# Patient Record
Sex: MALE | Race: WHITE | Employment: UNEMPLOYED | URBAN - METROPOLITAN AREA
[De-identification: names, ages, dates, MRNs, and addresses within clinical notes are randomized per-mention and may not be internally consistent; named-entity substitution may affect disease eponyms.]

---

## 2018-06-16 ENCOUNTER — APPOINTMENT (OUTPATIENT)
Dept: GENERAL RADIOLOGY | Age: 28
End: 2018-06-16
Attending: PHYSICIAN ASSISTANT
Payer: COMMERCIAL

## 2018-06-16 ENCOUNTER — HOSPITAL ENCOUNTER (EMERGENCY)
Age: 28
Discharge: HOME | End: 2018-06-16
Attending: EMERGENCY MEDICINE | Admitting: EMERGENCY MEDICINE
Payer: COMMERCIAL

## 2018-06-16 VITALS
SYSTOLIC BLOOD PRESSURE: 148 MMHG | DIASTOLIC BLOOD PRESSURE: 92 MMHG | OXYGEN SATURATION: 98 % | TEMPERATURE: 99 F | WEIGHT: 203.48 LBS | RESPIRATION RATE: 12 BRPM | BODY MASS INDEX: 29.2 KG/M2 | HEART RATE: 95 BPM

## 2018-06-16 DIAGNOSIS — S61.552A DOG BITE OF WRIST, LEFT, INITIAL ENCOUNTER: Primary | ICD-10-CM
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
## 2018-06-16 DIAGNOSIS — S61.451A DOG BITE OF RIGHT HAND, INITIAL ENCOUNTER: ICD-10-CM
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
## 2018-06-16 DIAGNOSIS — F17.200 NICOTINE DEPENDENCE, UNCOMPLICATED, UNSPECIFIED NICOTINE PRODUCT TYPE: ICD-10-CM

## 2018-06-16 PROCEDURE — 74011000250 HC RX REV CODE- 250: Performed by: PHYSICIAN ASSISTANT

## 2018-06-16 PROCEDURE — 74011250637 HC RX REV CODE- 250/637: Performed by: PHYSICIAN ASSISTANT

## 2018-06-16 PROCEDURE — 99283 EMERGENCY DEPT VISIT LOW MDM: CPT

## 2018-06-16 PROCEDURE — 73110 X-RAY EXAM OF WRIST: CPT

## 2018-06-16 RX ORDER — AMOXICILLIN AND CLAVULANATE POTASSIUM 875; 125 MG/1; MG/1
1 TABLET, FILM COATED ORAL
Status: COMPLETED | OUTPATIENT
Start: 2018-06-16 | End: 2018-06-16

## 2018-06-16 RX ORDER — AMOXICILLIN AND CLAVULANATE POTASSIUM 875; 125 MG/1; MG/1
1 TABLET, FILM COATED ORAL 2 TIMES DAILY
Qty: 13 TAB | Refills: 0 | Status: SHIPPED | OUTPATIENT
Start: 2018-06-16 | End: 2021-04-09 | Stop reason: ALTCHOICE

## 2018-06-16 RX ADMIN — AMOXICILLIN AND CLAVULANATE POTASSIUM 1 TABLET: 875; 125 TABLET, FILM COATED ORAL at 14:51

## 2018-06-16 RX ADMIN — Medication 5 ML: at 14:51

## 2018-06-16 NOTE — DISCHARGE INSTRUCTIONS
Animal Bites: Care Instructions  Your Care Instructions  After an animal bite, the biggest concern is infection. The chance of infection depends on the type of animal that bit you, where on your body you were bitten, and your general health. Many animal bites are not closed with stitches, because this can increase the chance of infection. Your bite may take as little as 7 days or as long as several months to heal, depending on how bad it is. Taking good care of your wound at home will help it heal and reduce your chance of infection. The doctor has checked you carefully, but problems can develop later. If you notice any problems or new symptoms, get medical treatment right away. Follow-up care is a key part of your treatment and safety. Be sure to make and go to all appointments, and call your doctor if you are having problems. It's also a good idea to know your test results and keep a list of the medicines you take. How can you care for yourself at home? · If your doctor told you how to care for your wound, follow your doctor's instructions. If you did not get instructions, follow this general advice:  ¨ After 24 to 48 hours, gently wash the wound with clean water 2 times a day. Do not scrub or soak the wound. Don't use hydrogen peroxide or alcohol, which can slow healing. ¨ You may cover the wound with a thin layer of petroleum jelly, such as Vaseline, and a nonstick bandage. ¨ Apply more petroleum jelly and replace the bandage as needed. · After you shower, gently dry the wound with a clean towel. · If your doctor has closed the wound, cover the bandage with a plastic bag before you take a shower. · A small amount of skin redness and swelling around the wound edges and the stitches or staples is normal. Your wound may itch or feel irritated. Do not scratch or rub the wound.   · Ask your doctor if you can take an over-the-counter pain medicine, such as acetaminophen (Tylenol), ibuprofen (Advil, Motrin), or naproxen (Aleve). Read and follow all instructions on the label. · Do not take two or more pain medicines at the same time unless the doctor told you to. Many pain medicines have acetaminophen, which is Tylenol. Too much acetaminophen (Tylenol) can be harmful. · If your bite puts you at risk for rabies, you will get a series of shots over the next few weeks to prevent rabies. Your doctor will tell you when to get the shots. It is very important that you get the full cycle of shots. Follow your doctor's instructions exactly. · You may need a tetanus shot if you have not received one in the last 5 years. · If your doctor prescribed antibiotics, take them as directed. Do not stop taking them just because you feel better. You need to take the full course of antibiotics. When should you call for help? Call your doctor now or seek immediate medical care if:  ? · The skin near the bite turns cold or pale or it changes color. ? · You lose feeling in the area near the bite, or it feels numb or tingly. ? · You have trouble moving a limb near the bite. ? · You have symptoms of infection, such as:  ¨ Increased pain, swelling, warmth, or redness near the wound. ¨ Red streaks leading from the wound. ¨ Pus draining from the wound. ¨ A fever. ? · Blood soaks through the bandage. Oozing small amounts of blood is normal.   ? · Your pain is getting worse. ? Watch closely for changes in your health, and be sure to contact your doctor if you are not getting better as expected. Where can you learn more? Go to http://yoni-eleni.info/. Enter B363 in the search box to learn more about \"Animal Bites: Care Instructions. \"  Current as of: March 20, 2017  Content Version: 11.4  © 2950-1311 [employer]. Care instructions adapted under license by [employer] (which disclaims liability or warranty for this information).  If you have questions about a medical condition or this instruction, always ask your healthcare professional. [place] [address] any warranty or liability for your use of this information.

## 2018-06-16 NOTE — LETTER
[address] 
[facility] EMERGENCY DEPT 
[address] [place] [address] [address] [id number] [phone] Work/School Note Date: 6/16/2018 To Whom It May concern: 
 
Himanshu Miranda was seen and treated today in the emergency room by the following provider(s): 
Physician Assistant: TERESA Barker. Himanshu Miranda may return to work on 6/19/18. Sincerely, Art Barker

## 2018-06-19 ENCOUNTER — HOSPITAL ENCOUNTER (OUTPATIENT)
Dept: MRI IMAGING | Age: 28
Discharge: HOME | End: 2018-06-19
Attending: ORTHOPAEDIC SURGERY
Payer: COMMERCIAL

## 2018-06-19 VITALS — BODY MASS INDEX: 28.7 KG/M2 | WEIGHT: 200 LBS
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
## 2018-06-19 DIAGNOSIS — S61.552A: ICD-10-CM

## 2018-06-19 PROCEDURE — 74011250636 HC RX REV CODE- 250/636: Performed by: ORTHOPAEDIC SURGERY

## 2018-06-19 PROCEDURE — A9575 INJ GADOTERATE MEGLUMI 0.1ML: HCPCS | Performed by: ORTHOPAEDIC SURGERY

## 2018-06-19 PROCEDURE — 73223 MRI JOINT UPR EXTR W/O&W/DYE: CPT

## 2018-06-19 RX ORDER — GADOTERATE MEGLUMINE 376.9 MG/ML
19 INJECTION INTRAVENOUS
Status: COMPLETED | OUTPATIENT
Start: 2018-06-19 | End: 2018-06-19

## 2018-06-19 RX ADMIN — GADOTERATE MEGLUMINE 19 ML: 376.9 INJECTION INTRAVENOUS at 17:21

## 2021-04-09 ENCOUNTER — OFFICE VISIT (OUTPATIENT)
Dept: FAMILY MEDICINE CLINIC | Age: 31
End: 2021-04-09

## 2021-04-09 VITALS
HEIGHT: 71 IN | DIASTOLIC BLOOD PRESSURE: 62 MMHG | OXYGEN SATURATION: 97 % | TEMPERATURE: 96.8 F | HEART RATE: 78 BPM | RESPIRATION RATE: 18 BRPM | SYSTOLIC BLOOD PRESSURE: 108 MMHG | WEIGHT: 193.2 LBS | BODY MASS INDEX: 27.05 KG/M2

## 2021-04-09 DIAGNOSIS — R11.0 CHRONIC NAUSEA: ICD-10-CM

## 2021-04-09 DIAGNOSIS — F11.20 METHADONE DEPENDENCE (HCC): ICD-10-CM

## 2021-04-09 DIAGNOSIS — F31.9 BIPOLAR AFFECTIVE DISORDER, REMISSION STATUS UNSPECIFIED (HCC): Primary | ICD-10-CM

## 2021-04-09 PROCEDURE — 99212 OFFICE O/P EST SF 10 MIN: CPT | Performed by: FAMILY MEDICINE

## 2021-04-09 NOTE — PROGRESS NOTES
Chief Complaint   Patient presents with    New Patient     New patient getting established. 1. Have you been to the ER, urgent care clinic since your last visit? Hospitalized since your last visit? No    2. Have you seen or consulted any other health care providers outside of the [address] [place] [address] since your last visit? Include any pap smears or colon screening.  No

## 2021-04-09 NOTE — PROGRESS NOTES
HISTORY OF PRESENT ILLNESS  Jazmin Altamirano is a 31 y.o. male. pt here for new patient visit. He reports pmh of former heroin use now on daily methadone,and pmh bipolar disorder on seroquel per psychiatry. He on questioning does c/o of chronic nausea,rarely with vomiting. He describes heavy marijuana use on a daily basis,though not while working. He works as an . New Patient  The history is provided by the patient. This is a new problem. Pertinent negatives include no chest pain and no headaches. Nausea   The history is provided by the patient. This is a chronic problem. The problem has not changed since onset. There has been no fever. Pertinent negatives include no chills, no fever, no sweats, no diarrhea, no headaches, no myalgias and no headaches. Depression  The history is provided by the patient. This is a chronic problem. The problem occurs daily. The problem has not changed since onset. Pertinent negatives include no chest pain and no headaches. Review of Systems   Constitutional: Positive for malaise/fatigue. Negative for chills and fever. Cardiovascular: Negative for chest pain, palpitations and orthopnea. Gastrointestinal: Positive for nausea. Negative for diarrhea, heartburn and vomiting. Musculoskeletal: Negative for myalgias. Neurological: Negative for headaches. Psychiatric/Behavioral: Positive for depression. Physical Exam  Constitutional:       Appearance: Normal appearance. He is normal weight. Comments: Lethargic,cooperative   HENT:      Head: Normocephalic and atraumatic. Right Ear: Tympanic membrane normal.      Left Ear: Tympanic membrane normal.      Nose: Nose normal.      Mouth/Throat:      Mouth: Mucous membranes are moist.   Eyes:      Pupils: Pupils are equal, round, and reactive to light. Neck:      Musculoskeletal: Normal range of motion and neck supple. Cardiovascular:      Rate and Rhythm: Normal rate and regular rhythm.       Heart sounds: Normal heart sounds. Pulmonary:      Effort: Pulmonary effort is normal.      Breath sounds: Normal breath sounds. Abdominal:      General: Abdomen is flat. There is no distension. Palpations: Abdomen is soft. There is no mass. Tenderness: There is no abdominal tenderness. There is no right CVA tenderness, guarding or rebound. Hernia: No hernia is present. Skin:     General: Skin is warm and dry. Neurological:      Mental Status: He is alert. ASSESSMENT and PLAN  Diagnoses and all orders for this visit:    1. Bipolar affective disorder, remission status unspecified ([facility] [address]    2. Methadone dependence ([facility] [address]    3. Chronic nausea      Patient was mildly lethargic and disorganized. C/O chronic nausea,did not wish to have bood work done. I advised Him I would be glad to help him in the future if he desires  Follow-up and Dispositions    · Return in about 3 months (around 7/9/2021).

## 2021-04-25 ENCOUNTER — APPOINTMENT (OUTPATIENT)
Dept: GENERAL RADIOLOGY | Age: 31
End: 2021-04-25
Payer: COMMERCIAL

## 2021-04-25 ENCOUNTER — HOSPITAL ENCOUNTER (EMERGENCY)
Age: 31
Discharge: HOME | End: 2021-04-25
Attending: EMERGENCY MEDICINE
Payer: COMMERCIAL

## 2021-04-25 VITALS
SYSTOLIC BLOOD PRESSURE: 121 MMHG | OXYGEN SATURATION: 97 % | TEMPERATURE: 98.6 F | DIASTOLIC BLOOD PRESSURE: 70 MMHG | BODY MASS INDEX: 26.22 KG/M2 | HEIGHT: 72 IN | HEART RATE: 79 BPM | RESPIRATION RATE: 16 BRPM | WEIGHT: 193.56 LBS

## 2021-04-25 DIAGNOSIS — T14.8XXA ABRASION: ICD-10-CM

## 2021-04-25 DIAGNOSIS — Z72.0 TOBACCO USE: ICD-10-CM

## 2021-04-25 DIAGNOSIS — S62.315A CLOSED DISPLACED FRACTURE OF BASE OF FOURTH METACARPAL BONE OF LEFT HAND, INITIAL ENCOUNTER: Primary | ICD-10-CM

## 2021-04-25 PROCEDURE — 73130 X-RAY EXAM OF HAND: CPT

## 2021-04-25 PROCEDURE — 99283 EMERGENCY DEPT VISIT LOW MDM: CPT

## 2021-04-25 PROCEDURE — 75810000053 HC SPLINT APPLICATION

## 2021-04-25 PROCEDURE — 75810000293 HC SIMP/SUPERF WND  RPR

## 2021-04-25 PROCEDURE — 74011250636 HC RX REV CODE- 250/636: Performed by: PHYSICIAN ASSISTANT

## 2021-04-25 PROCEDURE — 96372 THER/PROPH/DIAG INJ SC/IM: CPT

## 2021-04-25 PROCEDURE — 73110 X-RAY EXAM OF WRIST: CPT

## 2021-04-25 PROCEDURE — 74011250637 HC RX REV CODE- 250/637: Performed by: PHYSICIAN ASSISTANT

## 2021-04-25 RX ORDER — NALOXONE HYDROCHLORIDE 4 MG/.1ML
SPRAY NASAL
Qty: 1 EACH | Refills: 0 | Status: SHIPPED | OUTPATIENT
Start: 2021-04-25

## 2021-04-25 RX ORDER — KETOROLAC TROMETHAMINE 30 MG/ML
30 INJECTION, SOLUTION INTRAMUSCULAR; INTRAVENOUS
Status: COMPLETED | OUTPATIENT
Start: 2021-04-25 | End: 2021-04-25

## 2021-04-25 RX ORDER — DIAZEPAM 5 MG/1
5 TABLET ORAL
Status: COMPLETED | OUTPATIENT
Start: 2021-04-25 | End: 2021-04-25

## 2021-04-25 RX ORDER — CEFAZOLIN SODIUM 1 G/3ML
1 INJECTION, POWDER, FOR SOLUTION INTRAMUSCULAR; INTRAVENOUS
Status: COMPLETED | OUTPATIENT
Start: 2021-04-25 | End: 2021-04-25

## 2021-04-25 RX ORDER — DIAZEPAM 5 MG/1
5 TABLET ORAL
Qty: 8 TAB | Refills: 0 | Status: SHIPPED | OUTPATIENT
Start: 2021-04-25

## 2021-04-25 RX ORDER — CEPHALEXIN 500 MG/1
500 CAPSULE ORAL 3 TIMES DAILY
Qty: 21 CAP | Refills: 0 | Status: SHIPPED | OUTPATIENT
Start: 2021-04-25 | End: 2021-05-02

## 2021-04-25 RX ORDER — BACITRACIN 500 [USP'U]/G
OINTMENT TOPICAL
Status: DISCONTINUED | OUTPATIENT
Start: 2021-04-25 | End: 2021-04-25

## 2021-04-25 RX ORDER — KETOROLAC TROMETHAMINE 10 MG/1
10 TABLET, FILM COATED ORAL
Qty: 10 TAB | Refills: 0 | Status: SHIPPED | OUTPATIENT
Start: 2021-04-25

## 2021-04-25 RX ADMIN — CEFAZOLIN SODIUM 1 G: 1 INJECTION, POWDER, FOR SOLUTION INTRAMUSCULAR; INTRAVENOUS at 12:35

## 2021-04-25 RX ADMIN — KETOROLAC TROMETHAMINE 30 MG: 30 INJECTION, SOLUTION INTRAMUSCULAR at 12:05

## 2021-04-25 RX ADMIN — DIAZEPAM 5 MG: 5 TABLET ORAL at 12:05

## 2021-04-25 NOTE — ED NOTES
KHALIF Wilson at bedside to provide discharge paperwork. Vital signs stable. Pt in no apparent distress at this time. Mental status at baseline. Ambulatory to waiting room with steady gate, discharge paperwork in hand. Patient and or family acknowledged and signed discharge instructions that were created/provided by the Physician. Signed discharge form with patient label placed in scan box. Accompanied by family to drive pt home.

## 2021-04-25 NOTE — ED PROVIDER NOTES
EMERGENCY DEPARTMENT HISTORY AND PHYSICAL EXAM      Date: 4/25/2021  Patient Name: Loyd Nicholson    History of Presenting Illness     Chief Complaint   Patient presents with    Hand Pain     pt states he injured his L wrist/hand yesterday working in the garden, pt states he is unsure what he hit his hand/wrist on. History Provided By: Patient    HPI: Loyd Nicholson, 31 y.o. male presents ambulatory to the emergency department with complaint of pain and swelling to the left wrist and hand since he fell yesterday. He reports he was working in the garden and slipped and fell onto his shed. He states he was covered in dirt and had some bleeding but it was too tender to touch it. He came in today complaining of severe swelling and pain with limited movement and is concerned for fracture. He reports some numbness and tingling. He is right-hand dominant. He states his tetanus is current. No fever chills. He has noted some redness to the dorsum of the hand. Patient states he cannot take any pain medication as he is on methadone. He vapes. He rates his pain an 8 out of 10 on the pain scale and describes it as a constant throbbing. Pt is o/w healthy without fever, chills, cough, congestion, ST, shortness of breath, chest pain, N/V/D. There are no other complaints, changes, or physical findings at this time. PCP: Boris Wong MD    Current Outpatient Medications   Medication Sig Dispense Refill    ketorolac (TORADOL) 10 mg tablet Take 1 Tab by mouth every six (6) hours as needed for Pain for up to 10 doses. 10 Tab 0    diazePAM (Valium) 5 mg tablet Take 1 Tab by mouth every eight (8) hours as needed (spasm/pain) for up to 8 doses. Max Daily Amount: 15 mg. 8 Tab 0    naloxone (NARCAN) 4 mg/actuation nasal spray Use 1 spray intranasally, then discard. Repeat with new spray every 2 min as needed for opioid overdose symptoms, alternating nostrils.  1 Each 0    cephALEXin (Keflex) 500 mg capsule Take 1 Cap by mouth three (3) times daily for 7 days. 21 Cap 0    methadone HCl (METHADONE PO) Take 240 mg by mouth.  QUEtiapine (SEROQUEL) 100 mg tablet Take 1 Tab by mouth two (2) times a day. (Patient taking differently: Take 100 mg by mouth two (2) times a day. Indications: Pt state he takes 1 tab at night.) 40 Tab 0    QUEtiapine (SEROQUEL) 300 mg tablet Take 1 Tab by mouth nightly. 20 Tab 0       Past History     Past Medical History:  Past Medical History:   Diagnosis Date    Anxiety     Bipolar affective ([facility] [address]     Heroin addiction ([facility] [address]        Past Surgical History:  Past Surgical History:   Procedure Laterality Date    HX OTHER SURGICAL      left arm       Family History:  Family History   Problem Relation Age of Onset    No Known Problems Mother     No Known Problems Father        Social History:  Social History     Tobacco Use    Smoking status: Former Smoker    Smokeless tobacco: Never Used   Substance Use Topics    Alcohol use: Not Currently    Drug use: Yes     Types: Heroin, Marijuana     Comment: Pt state he smoke weed all day untile he goes to bed at night. Allergies:  No Known Allergies      Review of Systems   Review of Systems   Constitutional: Negative for chills and fever. HENT: Negative for congestion, rhinorrhea and sore throat. Eyes: Negative for photophobia and visual disturbance. Respiratory: Negative for cough and shortness of breath. Cardiovascular: Negative for chest pain and palpitations. Gastrointestinal: Negative for diarrhea, nausea and vomiting. Endocrine: Negative for polydipsia, polyphagia and polyuria. Musculoskeletal: Positive for arthralgias and joint swelling. Negative for neck pain and neck stiffness. Skin: Positive for color change and wound. Negative for rash. Allergic/Immunologic: Negative for food allergies and immunocompromised state. Neurological: Negative for dizziness, weakness, numbness and headaches. Hematological: Negative for adenopathy. Does not bruise/bleed easily. Psychiatric/Behavioral: Negative for agitation and confusion. All other systems reviewed and are negative. Physical Exam   Physical Exam  Vitals signs and nursing note reviewed. Constitutional:       General: He is in acute distress. Appearance: Normal appearance. He is well-developed and normal weight. He is not ill-appearing, toxic-appearing or diaphoretic. HENT:      Head: Normocephalic and atraumatic. Nose: Nose normal. No congestion or rhinorrhea. Mouth/Throat:      Pharynx: No oropharyngeal exudate. Eyes:      General: No scleral icterus. Right eye: No discharge. Left eye: No discharge. Conjunctiva/sclera: Conjunctivae normal.   Neck:      Musculoskeletal: Normal range of motion and neck supple. No muscular tenderness. Thyroid: No thyromegaly. Vascular: No JVD. Trachea: No tracheal deviation. Cardiovascular:      Rate and Rhythm: Normal rate and regular rhythm. Pulses: Normal pulses. Heart sounds: Normal heart sounds. Pulmonary:      Effort: Pulmonary effort is normal. No respiratory distress. Breath sounds: Normal breath sounds. No wheezing. Chest:      Chest wall: No tenderness. Abdominal:      Palpations: Abdomen is soft. Tenderness: There is no abdominal tenderness. There is no right CVA tenderness, left CVA tenderness, guarding or rebound. Musculoskeletal:         General: Swelling, tenderness, deformity and signs of injury present. Comments: Decreased A/P ROM to L hand due to tenderness with palpation/movement, significant deformity with marked edema, superficial abrasion noted over base of 4th MCP, bleeding controlled, mild erythema noted to dorsum of hand. No warmth appreciated. 2+ distal pulses, NVI, sensation grossly intact to light touch. L wrist is minimally tender, decreased ROM noted.      Skin:     General: Skin is warm and dry.      Capillary Refill: Capillary refill takes less than 2 seconds. Findings: Bruising and erythema present. Comments: See MSK exam   Neurological:      General: No focal deficit present. Mental Status: He is alert and oriented to person, place, and time. Cranial Nerves: No cranial nerve deficit. Sensory: No sensory deficit. Motor: No weakness or abnormal muscle tone. Coordination: Coordination normal.   Psychiatric:         Mood and Affect: Mood normal.         Behavior: Behavior normal.         Judgment: Judgment normal.       Wound Repair    Date/Time: 4/25/2021 12:25 PM  Performed by: [address] [place] [address] provider: Dr. Parikh Screen  Preparation: skin prepped with ChloraPrep  Pre-procedure re-eval: Immediately prior to the procedure, the patient was reevaluated and found suitable for the planned procedure and any planned medications. Time out: Immediately prior to the procedure a time out was called to verify the correct patient, procedure, equipment, staff and marking as appropriate. .  Location details: left hand  Wound length:2.5 cm or less  Foreign bodies: no foreign bodies  Irrigation solution: tap water  Irrigation method: tap  Skin closure: glue  Patient tolerance: patient tolerated the procedure well with no immediate complications  My total time at bedside, performing this procedure was 1-15 minutes.   Splint, Ulnar Gutter    Date/Time: 4/25/2021 12:40 PM  Performed by: TERESA Andres  Authorized by: TERESA Andres     Consent:     Consent obtained:  Verbal    Consent given by:  Patient    Risks discussed:  Discoloration, numbness, pain and swelling    Alternatives discussed:  Alternative treatment and referral  Pre-procedure details:     Sensation:  Normal  Procedure details:     Laterality:  Left    Location:  Hand    Hand:  L hand    Splint type:  Ulnar gutter    Supplies:  Ortho-Glass  Post-procedure details:     Pain:  Improved    Sensation:  Normal    Skin color: Pink    Patient tolerance of procedure: Tolerated well, no immediate complications        Diagnostic Study Results     Labs -   No results found for this or any previous visit (from the past 12 hour(s)). Radiologic Studies -   XR WRIST LT AP/LAT/OBL MIN 3V   Final Result   Soft tissue swelling. No fracture. XR HAND LT MIN 3 V   Final Result   Acute angulated fracture of the distal fourth metacarpal.            Medical Decision Making   I am the first provider for this patient. I reviewed the vital signs, available nursing notes, past medical history, past surgical history, family history and social history. Vital Signs-Reviewed the patient's vital signs. Patient Vitals for the past 12 hrs:   Temp Pulse Resp BP SpO2   04/25/21 1121 98.6 °F (37 °C) 79 16 121/70 97 %           Records Reviewed: Nursing Notes, Old Medical Records, Previous Radiology Studies and Previous Laboratory Studies    Provider Notes (Medical Decision Making):   Fx, open fx, dislocation, soft tissue swelling, cellulitis, contusion    ED Course:   Initial assessment performed. The patients presenting problems have been discussed, and they are in agreement with the care plan formulated and outlined with them. I have encouraged them to ask questions as they arise throughout their visit. TOBACCO CESSATION COUNSELING  The patient was counseled on the dangers of tobacco use, and was advised to quit. Reviewed strategies to maximize success, including written materials. Pt with an abrasion over the affected suspected fx. Mild bleeding with cleaning/irrigation. Already concerned for infection, given swelling and mild redness. Will place a dermabond over the site. Have ordered 1g Ancef. Case d/w Dr. Kelly Gu who agreed with plan. Will consult orthopedics. CONSULT: ORTHOPEDICS  Case d/w Liseth Soliz NP via [place] who agrees with plan.  Will send home with antibiotics, apply ulnar gutter splint, and have patient follow up with Bin Santana MD.    Pt counseled at length to return immediately to the Emergency Dept for any concerns for compartment syndrome to include worsening swelling, numbness, tingling as well as any concerns for infection: increased redness, pain, swelling, or fever. Good understanding noted. DISCHARGE NOTE:  The care plan has been outline with the patient and/or family, who verbally conveyed understanding and agreement. Available results have been reviewed. Patient and/or family understand the follow up plan as outlined and discharge instructions. Should their condition deterioration at any time after discharge the patient agrees to return, follow up sooner than outlined or seek medical assistance at the closest Emergency Room as soon as possible. Questions have been answered. Discharge instructions and educational information regarding the patient's diagnosis as well a list of reasons why the patient would want to seek immediate medical attention, should their condition change, were reviewed directly with the patient/family          PLAN:  1. Discharge Medication List as of 4/25/2021 12:51 PM      START taking these medications    Details   ketorolac (TORADOL) 10 mg tablet Take 1 Tab by mouth every six (6) hours as needed for Pain for up to 10 doses. , Normal, Disp-10 Tab, R-0      diazePAM (Valium) 5 mg tablet Take 1 Tab by mouth every eight (8) hours as needed (spasm/pain) for up to 8 doses. Max Daily Amount: 15 mg., Normal, Disp-8 Tab, R-0      naloxone (NARCAN) 4 mg/actuation nasal spray Use 1 spray intranasally, then discard. Repeat with new spray every 2 min as needed for opioid overdose symptoms, alternating nostrils. , Normal, Disp-1 Each, R-0      cephALEXin (Keflex) 500 mg capsule Take 1 Cap by mouth three (3) times daily for 7 days. , Normal, Disp-21 Cap, R-0         CONTINUE these medications which have NOT CHANGED    Details   methadone HCl (METHADONE PO) Take 240 mg by mouth., Historical Med !! QUEtiapine (SEROQUEL) 100 mg tablet Take 1 Tab by mouth two (2) times a day. Print, 100 mg, Disp-40 Tab, R-0      !! QUEtiapine (SEROQUEL) 300 mg tablet Take 1 Tab by mouth nightly. Print, 300 mg, Disp-20 Tab, R-0       !! - Potential duplicate medications found. Please discuss with provider. 2.   Follow-up Information     Follow up With Specialties Details Why Contact Info    Alma Loera MD Hand Surgery On 4/26/2021  [address] [place] [address]  [address]  [address] [id number] [phone]      [facility] EMERGENCY DEPT Emergency Medicine  If symptoms worsen [address] [place] [address] Drive  [address] [facility]  [phone]        Return to ED if worse     Diagnosis     Clinical Impression:   1. Closed displaced fracture of base of fourth metacarpal bone of left hand, initial encounter    2. Abrasion    3.  Tobacco use

## 2021-04-25 NOTE — LETTER
[address] 
[facility] EMERGENCY DEPT 
[address] [facility] [place] [id number] [phone] Work/School Note Date: 4/25/2021 To Whom It May concern: 
 
 
Olga Lidia Bloom was seen and treated today in the emergency room. He may return to work in 1 to 2 days, as symptoms improve but will require light duty until cleared by the surgeon. Sincerely, Art Ortiz

## 2021-04-25 NOTE — DISCHARGE INSTRUCTIONS
Rest, ice, elevation. Follow up with Dr. Harley Willis office tomorrow for follow up. Avoid tobacco. Return to the Emergency Dept for any worsening pain, swelling, numbness, fever or redness.

## 2021-04-25 NOTE — LETTER
[address] 
\[facility]\"" EMERGENCY DEPT 
[address] [facility] [place] [id number] [phone] Work/School Note Date: 4/25/2021 To Whom It May concern: 
 
 
Avery Chow was seen and treated today in the emergency room. He may return to work for [employer] duty, as he is unable to use his L hand until cleared by the specialist. 
 
 
 
 
Sincerely, Art Butler

## 2021-11-30 ENCOUNTER — TRANSCRIBE ORDER (OUTPATIENT)
Dept: SCHEDULING | Age: 31
End: 2021-11-30

## 2021-11-30 DIAGNOSIS — J02.9 SORE THROAT: Primary | ICD-10-CM

## 2021-11-30 DIAGNOSIS — M54.2 NECK PAIN: ICD-10-CM

## 2021-11-30 DIAGNOSIS — F17.200 SMOKER: ICD-10-CM

## 2021-11-30 DIAGNOSIS — Z00.8 OTHER SPECIFIED GENERAL MEDICAL EXAMINATION: ICD-10-CM

## 2023-01-01 ENCOUNTER — APPOINTMENT (OUTPATIENT)
Dept: NON INVASIVE DIAGNOSTICS | Age: 33
DRG: 812 | End: 2023-01-01
Attending: INTERNAL MEDICINE
Payer: COMMERCIAL

## 2023-01-01 ENCOUNTER — APPOINTMENT (OUTPATIENT)
Dept: GENERAL RADIOLOGY | Age: 33
DRG: 812 | End: 2023-01-01
Attending: INTERNAL MEDICINE
Payer: COMMERCIAL

## 2023-01-01 ENCOUNTER — APPOINTMENT (OUTPATIENT)
Dept: GENERAL RADIOLOGY | Age: 33
DRG: 812 | End: 2023-01-01
Attending: STUDENT IN AN ORGANIZED HEALTH CARE EDUCATION/TRAINING PROGRAM
Payer: COMMERCIAL

## 2023-01-01 ENCOUNTER — HOSPITAL ENCOUNTER (OUTPATIENT)
Age: 33
End: 2023-03-05
Attending: INTERNAL MEDICINE | Admitting: INTERNAL MEDICINE
Payer: COMMERCIAL

## 2023-01-01 ENCOUNTER — APPOINTMENT (OUTPATIENT)
Dept: CT IMAGING | Age: 33
DRG: 812 | End: 2023-01-01
Attending: STUDENT IN AN ORGANIZED HEALTH CARE EDUCATION/TRAINING PROGRAM
Payer: COMMERCIAL

## 2023-01-01 ENCOUNTER — HOSPITAL ENCOUNTER (INPATIENT)
Age: 33
LOS: 1 days | Discharge: OTHER INSTITUTION | DRG: 812 | End: 2023-03-02
Attending: STUDENT IN AN ORGANIZED HEALTH CARE EDUCATION/TRAINING PROGRAM | Admitting: INTERNAL MEDICINE
Payer: COMMERCIAL

## 2023-01-01 VITALS
SYSTOLIC BLOOD PRESSURE: 176 MMHG | HEART RATE: 135 BPM | WEIGHT: 184.75 LBS | DIASTOLIC BLOOD PRESSURE: 124 MMHG | HEIGHT: 72 IN | RESPIRATION RATE: 24 BRPM | BODY MASS INDEX: 25.02 KG/M2 | TEMPERATURE: 96.4 F | OXYGEN SATURATION: 97 %

## 2023-01-01 DIAGNOSIS — I46.9 CARDIAC ARREST (HCC): ICD-10-CM

## 2023-01-01 DIAGNOSIS — F11.93 OPIATE WITHDRAWAL (HCC): ICD-10-CM

## 2023-01-01 DIAGNOSIS — G93.1 SEVERE ANOXIC-ISCHEMIC ENCEPHALOPATHY (HCC): ICD-10-CM

## 2023-01-01 DIAGNOSIS — F41.9 ANXIETY: ICD-10-CM

## 2023-01-01 DIAGNOSIS — Z00.5 TRANSPLANT DONOR EVALUATION: ICD-10-CM

## 2023-01-01 DIAGNOSIS — R55 CONVULSIVE SYNCOPE: Primary | ICD-10-CM

## 2023-01-01 DIAGNOSIS — I67.82 SEVERE ANOXIC-ISCHEMIC ENCEPHALOPATHY (HCC): ICD-10-CM

## 2023-01-01 LAB
A1 AB SERPL QL: NORMAL
A1 AB SERPL QL: NORMAL
A2 CELLS,A2C: NORMAL
A2 CELLS,A2C: NORMAL
ABO + RH BLD: NORMAL
ABO + RH BLD: NORMAL
ALBUMIN SERPL-MCNC: 2.6 G/DL (ref 3.5–5)
ALBUMIN SERPL-MCNC: 2.8 G/DL (ref 3.5–5)
ALBUMIN/GLOB SERPL: 1.1 (ref 1.1–2.2)
ALBUMIN/GLOB SERPL: 1.1 (ref 1.1–2.2)
ALP SERPL-CCNC: 55 U/L (ref 45–117)
ALP SERPL-CCNC: 68 U/L (ref 45–117)
ALT SERPL-CCNC: 334 U/L (ref 12–78)
ALT SERPL-CCNC: 476 U/L (ref 12–78)
AMPHET UR QL SCN: NEGATIVE
AMYLASE SERPL-CCNC: 1166 U/L (ref 25–115)
ANION GAP BLD CALC-SCNC: ABNORMAL (ref 10–20)
ANION GAP SERPL CALC-SCNC: 10 MMOL/L (ref 5–15)
ANION GAP SERPL CALC-SCNC: 11 MMOL/L (ref 5–15)
ANION GAP SERPL CALC-SCNC: 22 MMOL/L (ref 5–15)
APPEARANCE UR: ABNORMAL
APTT PPP: 32.3 SEC (ref 22.1–31)
ARTERIAL PATENCY WRIST A: ABNORMAL
ARTERIAL PATENCY WRIST A: YES
AST SERPL-CCNC: 277 U/L (ref 15–37)
AST SERPL-CCNC: 287 U/L (ref 15–37)
ATRIAL RATE: 277 BPM
BACTERIA URNS QL MICRO: ABNORMAL /HPF
BARBITURATES UR QL SCN: NEGATIVE
BASE DEFICIT BLD-SCNC: 3.1 MMOL/L
BASE DEFICIT BLDA-SCNC: 20.7 MMOL/L
BASE DEFICIT BLDA-SCNC: 3.5 MMOL/L
BASE DEFICIT BLDA-SCNC: 7.6 MMOL/L
BASE DEFICIT BLDA-SCNC: 7.9 MMOL/L
BASE DEFICIT BLDA-SCNC: 8.3 MMOL/L
BASE DEFICIT BLDA-SCNC: 8.4 MMOL/L
BASE DEFICIT BLDA-SCNC: 8.5 MMOL/L
BASOPHILS # BLD: 0 K/UL (ref 0–0.1)
BASOPHILS # BLD: 0 K/UL (ref 0–0.1)
BASOPHILS NFR BLD: 0 % (ref 0–1)
BASOPHILS NFR BLD: 0 % (ref 0–1)
BDY SITE: ABNORMAL
BENZODIAZ UR QL: POSITIVE
BILIRUB DIRECT SERPL-MCNC: 0.5 MG/DL (ref 0–0.2)
BILIRUB SERPL-MCNC: 0.4 MG/DL (ref 0.2–1)
BILIRUB SERPL-MCNC: 1.4 MG/DL (ref 0.2–1)
BILIRUB UR QL: NEGATIVE
BLOOD BANK CMNT PATIENT-IMP: NORMAL
BLOOD BANK CMNT PATIENT-IMP: NORMAL
BUN SERPL-MCNC: 18 MG/DL (ref 6–20)
BUN SERPL-MCNC: 31 MG/DL (ref 6–20)
BUN SERPL-MCNC: 41 MG/DL (ref 6–20)
BUN/CREAT SERPL: 11 (ref 12–20)
BUN/CREAT SERPL: 12 (ref 12–20)
BUN/CREAT SERPL: 9 (ref 12–20)
CA-I BLD-MCNC: 1.3 MMOL/L (ref 1.12–1.32)
CA-I BLD-SCNC: 1.03 MMOL/L (ref 1.12–1.32)
CALCIUM SERPL-MCNC: 7.5 MG/DL (ref 8.5–10.1)
CALCIUM SERPL-MCNC: 7.7 MG/DL (ref 8.5–10.1)
CALCIUM SERPL-MCNC: 8.4 MG/DL (ref 8.5–10.1)
CALCULATED R AXIS, ECG10: 92 DEGREES
CALCULATED T AXIS, ECG11: -132 DEGREES
CANNABINOIDS UR QL SCN: POSITIVE
CHLORIDE BLD-SCNC: 99 MMOL/L (ref 100–108)
CHLORIDE SERPL-SCNC: 106 MMOL/L (ref 97–108)
CHLORIDE SERPL-SCNC: 107 MMOL/L (ref 97–108)
CHLORIDE SERPL-SCNC: 96 MMOL/L (ref 97–108)
CK SERPL-CCNC: 900 U/L (ref 39–308)
CO2 SERPL-SCNC: 22 MMOL/L (ref 21–32)
COCAINE UR QL SCN: POSITIVE
COHGB MFR BLD: 0.3 % (ref 1–2)
COLOR UR: ABNORMAL
COMMENT, HOLDF: NORMAL
COMMENT, HOLDF: NORMAL
CREAT SERPL-MCNC: 1.65 MG/DL (ref 0.7–1.3)
CREAT SERPL-MCNC: 2.67 MG/DL (ref 0.7–1.3)
CREAT SERPL-MCNC: 4.48 MG/DL (ref 0.7–1.3)
CREAT UR-MCNC: 1.3 MG/DL (ref 0.6–1.3)
DIAGNOSIS, 93000: NORMAL
DIFFERENTIAL METHOD BLD: ABNORMAL
DIFFERENTIAL METHOD BLD: ABNORMAL
DRUG SCRN COMMENT,DRGCM: ABNORMAL
ECHO AV PEAK GRADIENT: 2 MMHG
ECHO AV PEAK VELOCITY: 0.8 M/S
ECHO AV VELOCITY RATIO: 0.88
ECHO LV E' LATERAL VELOCITY: 9 CM/S
ECHO LV E' SEPTAL VELOCITY: 8 CM/S
ECHO LV FRACTIONAL SHORTENING: 13 % (ref 28–44)
ECHO LV INTERNAL DIMENSION DIASTOLE INDEX: 1.86 CM/M2
ECHO LV INTERNAL DIMENSION DIASTOLIC: 3.9 CM (ref 4.2–5.9)
ECHO LV INTERNAL DIMENSION SYSTOLIC INDEX: 1.62 CM/M2
ECHO LV INTERNAL DIMENSION SYSTOLIC: 3.4 CM
ECHO LV IVSD: 0.9 CM (ref 0.6–1)
ECHO LV MASS 2D: 131 G (ref 88–224)
ECHO LV MASS INDEX 2D: 62.4 G/M2 (ref 49–115)
ECHO LV POSTERIOR WALL DIASTOLIC: 1.2 CM (ref 0.6–1)
ECHO LV RELATIVE WALL THICKNESS RATIO: 0.62
ECHO LVOT PEAK GRADIENT: 2 MMHG
ECHO LVOT PEAK VELOCITY: 0.7 M/S
ECHO MV A VELOCITY: 0.45 M/S
ECHO MV E DECELERATION TIME (DT): 109.9 MS
ECHO MV E VELOCITY: 0.54 M/S
ECHO MV E/A RATIO: 1.2
ECHO MV E/E' LATERAL: 6
ECHO MV E/E' RATIO (AVERAGED): 6.38
ECHO MV E/E' SEPTAL: 6.75
ECHO MV MAX VELOCITY: 0.4 M/S
ECHO MV MEAN GRADIENT: 0 MMHG
ECHO MV MEAN VELOCITY: 0.3 M/S
ECHO MV PEAK GRADIENT: 1 MMHG
ECHO MV VTI: 7.5 CM
ECHO RV FREE WALL PEAK S': 8 CM/S
ECHO RV TAPSE: 1.6 CM (ref 1.7–?)
EOSINOPHIL # BLD: 0 K/UL (ref 0–0.4)
EOSINOPHIL # BLD: 0 K/UL (ref 0–0.4)
EOSINOPHIL NFR BLD: 0 % (ref 0–7)
EOSINOPHIL NFR BLD: 0 % (ref 0–7)
EPITH CASTS URNS QL MICRO: ABNORMAL /LPF
ERYTHROCYTE [DISTWIDTH] IN BLOOD BY AUTOMATED COUNT: 12.5 % (ref 11.5–14.5)
ERYTHROCYTE [DISTWIDTH] IN BLOOD BY AUTOMATED COUNT: 12.6 % (ref 11.5–14.5)
ERYTHROCYTE [DISTWIDTH] IN BLOOD BY AUTOMATED COUNT: 12.6 % (ref 11.5–14.5)
EST. AVERAGE GLUCOSE BLD GHB EST-MCNC: 108 MG/DL
FIBRINOGEN PPP-MCNC: 375 MG/DL (ref 200–475)
FIO2 ON VENT: 100 %
FIO2 ON VENT: 60 %
FIO2 ON VENT: 90 %
GAS FLOW.O2 O2 DELIVERY SYS: 10 L/MIN
GAS FLOW.O2 O2 DELIVERY SYS: 10 L/MIN
GAS FLOW.O2 O2 DELIVERY SYS: ABNORMAL
GAS FLOW.O2 SETTING OXYMISER: 20
GAS FLOW.O2 SETTING OXYMISER: 24 BPM
GAS FLOW.O2 SETTING OXYMISER: 26
GAS FLOW.O2 SETTING OXYMISER: 28
GGT SERPL-CCNC: 123 U/L (ref 15–85)
GLOBULIN SER CALC-MCNC: 2.4 G/DL (ref 2–4)
GLOBULIN SER CALC-MCNC: 2.6 G/DL (ref 2–4)
GLUCOSE BLD STRIP.AUTO-MCNC: 131 MG/DL (ref 65–117)
GLUCOSE BLD STRIP.AUTO-MCNC: 134 MG/DL (ref 65–117)
GLUCOSE BLD STRIP.AUTO-MCNC: 137 MG/DL (ref 65–117)
GLUCOSE BLD STRIP.AUTO-MCNC: 337 MG/DL (ref 65–117)
GLUCOSE BLD STRIP.AUTO-MCNC: 81 MG/DL (ref 65–117)
GLUCOSE SERPL-MCNC: 137 MG/DL (ref 65–100)
GLUCOSE SERPL-MCNC: 265 MG/DL (ref 65–100)
GLUCOSE SERPL-MCNC: 354 MG/DL (ref 65–100)
GLUCOSE SERPL-MCNC: 90 MG/DL (ref 65–100)
GLUCOSE UR STRIP.AUTO-MCNC: NEGATIVE MG/DL
GRAN CASTS URNS QL MICRO: ABNORMAL /LPF
HBA1C MFR BLD: 5.4 % (ref 4–5.6)
HBV SURFACE AG SER QL: <0.1 INDEX
HBV SURFACE AG SER QL: NEGATIVE
HCO3 BLD-SCNC: 20.4 MMOL/L (ref 22–26)
HCO3 BLDA-SCNC: 16 MMOL/L (ref 22–26)
HCO3 BLDA-SCNC: 19 MMOL/L (ref 22–26)
HCO3 BLDA-SCNC: 20 MMOL/L (ref 22–26)
HCO3 BLDA-SCNC: 21 MMOL/L (ref 22–26)
HCO3 BLDA-SCNC: 21 MMOL/L (ref 22–26)
HCO3 BLDA-SCNC: 22 MMOL/L (ref 22–26)
HCO3 BLDA-SCNC: 22 MMOL/L (ref 22–26)
HCT VFR BLD AUTO: 34.5 % (ref 36.6–50.3)
HCT VFR BLD AUTO: 34.9 % (ref 36.6–50.3)
HCT VFR BLD AUTO: 36.1 % (ref 36.6–50.3)
HCT VFR BLD AUTO: 41.7 % (ref 36.6–50.3)
HCV AB SERPL QL IA: NONREACTIVE
HGB BLD-MCNC: 11.1 G/DL (ref 12.1–17)
HGB BLD-MCNC: 11.8 G/DL (ref 12.1–17)
HGB BLD-MCNC: 12.3 G/DL (ref 12.1–17)
HGB BLD-MCNC: 14.1 G/DL (ref 12.1–17)
HGB UR QL STRIP: NEGATIVE
HIV1 P24 AG SERPL QL IA: NONREACTIVE
HIV1+2 AB SERPL QL IA: NONREACTIVE
IMM GRANULOCYTES # BLD AUTO: 0 K/UL (ref 0–0.04)
IMM GRANULOCYTES # BLD AUTO: 0 K/UL (ref 0–0.04)
IMM GRANULOCYTES NFR BLD AUTO: 0 % (ref 0–0.5)
IMM GRANULOCYTES NFR BLD AUTO: 0 % (ref 0–0.5)
INR PPP: 1.2 (ref 0.9–1.1)
INR PPP: 1.4 (ref 0.9–1.1)
KETONES UR QL STRIP.AUTO: ABNORMAL MG/DL
LACTATE BLD-SCNC: 14.27 MMOL/L (ref 0.4–2)
LACTATE SERPL-SCNC: 10.7 MMOL/L (ref 0.4–2)
LACTATE SERPL-SCNC: 2.1 MMOL/L (ref 0.4–2)
LACTATE SERPL-SCNC: 2.7 MMOL/L (ref 0.4–2)
LEUKOCYTE ESTERASE UR QL STRIP.AUTO: NEGATIVE
LIPASE SERPL-CCNC: 1281 U/L (ref 73–393)
LYMPHOCYTES # BLD: 2.8 K/UL (ref 0.8–3.5)
LYMPHOCYTES # BLD: 6 K/UL (ref 0.8–3.5)
LYMPHOCYTES NFR BLD: 17 % (ref 12–49)
LYMPHOCYTES NFR BLD: 72 % (ref 12–49)
MAGNESIUM SERPL-MCNC: 1.5 MG/DL (ref 1.6–2.4)
MAGNESIUM SERPL-MCNC: 1.8 MG/DL (ref 1.6–2.4)
MCH RBC QN AUTO: 30.6 PG (ref 26–34)
MCH RBC QN AUTO: 31.1 PG (ref 26–34)
MCH RBC QN AUTO: 31.5 PG (ref 26–34)
MCHC RBC AUTO-ENTMCNC: 31.8 G/DL (ref 30–36.5)
MCHC RBC AUTO-ENTMCNC: 33.8 G/DL (ref 30–36.5)
MCHC RBC AUTO-ENTMCNC: 35.7 G/DL (ref 30–36.5)
MCV RBC AUTO: 87.3 FL (ref 80–99)
MCV RBC AUTO: 90.5 FL (ref 80–99)
MCV RBC AUTO: 99.1 FL (ref 80–99)
METAMYELOCYTES NFR BLD MANUAL: 1 %
METHADONE UR QL: NEGATIVE
METHGB MFR BLD: 0.3 % (ref 0–1.4)
METHGB MFR BLD: 0.3 % (ref 0–1.4)
METHGB MFR BLD: 0.4 % (ref 0–1.4)
MONOCYTES # BLD: 0.5 K/UL (ref 0–1)
MONOCYTES # BLD: 2.5 K/UL (ref 0–1)
MONOCYTES NFR BLD: 15 % (ref 5–13)
MONOCYTES NFR BLD: 6 % (ref 5–13)
MUCOUS THREADS URNS QL MICRO: ABNORMAL /LPF
MYELOCYTES NFR BLD MANUAL: 3 %
NEUTS BAND NFR BLD MANUAL: 4 %
NEUTS SEG # BLD: 1.5 K/UL (ref 1.8–8)
NEUTS SEG # BLD: 11.4 K/UL (ref 1.8–8)
NEUTS SEG NFR BLD: 14 % (ref 32–75)
NEUTS SEG NFR BLD: 68 % (ref 32–75)
NITRITE UR QL STRIP.AUTO: NEGATIVE
NRBC # BLD: 0 K/UL (ref 0–0.01)
NRBC # BLD: 0 K/UL (ref 0–0.01)
NRBC # BLD: 0.06 K/UL (ref 0–0.01)
NRBC BLD-RTO: 0 PER 100 WBC
NRBC BLD-RTO: 0 PER 100 WBC
NRBC BLD-RTO: 0.7 PER 100 WBC
O2/TOTAL GAS SETTING VFR VENT: 100 %
OPIATES UR QL: POSITIVE
OTHER,OTHU: ABNORMAL
OXYHGB MFR BLD: 98.9 % (ref 94–97)
PCO2 BLD: 30.7 MMHG (ref 35–45)
PCO2 BLDA: 34 MMHG (ref 35–45)
PCO2 BLDA: 43 MMHG (ref 35–45)
PCO2 BLDA: 50 MMHG (ref 35–45)
PCO2 BLDA: 57 MMHG (ref 35–45)
PCO2 BLDA: 64 MMHG (ref 35–45)
PCO2 BLDA: 72 MMHG (ref 35–45)
PCO2 BLDA: 98 MMHG (ref 35–45)
PCO2 BLDV: >110 MMHG (ref 41–51)
PCP UR QL: NEGATIVE
PEEP RESPIRATORY: 10
PEEP RESPIRATORY: 14
PEEP RESPIRATORY: 5
PEEP RESPIRATORY: 5
PEEP RESPIRATORY: 5 CMH2O
PEEP RESPIRATORY: 8
PH BLD: 7.43 (ref 7.35–7.45)
PH BLDA: 6.82 (ref 7.35–7.45)
PH BLDA: 7.11 (ref 7.35–7.45)
PH BLDA: 7.14 (ref 7.35–7.45)
PH BLDA: 7.18 (ref 7.35–7.45)
PH BLDA: 7.22 (ref 7.35–7.45)
PH BLDA: 7.27 (ref 7.35–7.45)
PH BLDA: 7.4 (ref 7.35–7.45)
PH BLDV: 6.73 (ref 7.32–7.42)
PH UR STRIP: 5.5 (ref 5–8)
PHOSPHATE SERPL-MCNC: 3.5 MG/DL (ref 2.6–4.7)
PHOSPHATE SERPL-MCNC: 3.9 MG/DL (ref 2.6–4.7)
PLATELET # BLD AUTO: 176 K/UL (ref 150–400)
PLATELET # BLD AUTO: 188 K/UL (ref 150–400)
PLATELET # BLD AUTO: 246 K/UL (ref 150–400)
PMV BLD AUTO: 8.8 FL (ref 8.9–12.9)
PMV BLD AUTO: 9.1 FL (ref 8.9–12.9)
PMV BLD AUTO: 9.1 FL (ref 8.9–12.9)
PO2 BLD: 429 MMHG (ref 80–100)
PO2 BLDA: 154 MMHG (ref 80–100)
PO2 BLDA: 158 MMHG (ref 80–100)
PO2 BLDA: 391 MMHG (ref 80–100)
PO2 BLDA: 392 MMHG (ref 80–100)
PO2 BLDA: 405 MMHG (ref 80–100)
PO2 BLDA: 413 MMHG (ref 80–100)
PO2 BLDA: 67 MMHG (ref 80–100)
PO2 BLDV: 26 MMHG (ref 25–40)
POTASSIUM BLD-SCNC: 4.4 MMOL/L (ref 3.5–5.5)
POTASSIUM SERPL-SCNC: 3.6 MMOL/L (ref 3.5–5.1)
POTASSIUM SERPL-SCNC: 3.7 MMOL/L (ref 3.5–5.1)
POTASSIUM SERPL-SCNC: 4.2 MMOL/L (ref 3.5–5.1)
PROCALCITONIN SERPL-MCNC: 188.49 NG/ML
PROT SERPL-MCNC: 5 G/DL (ref 6.4–8.2)
PROT SERPL-MCNC: 5.4 G/DL (ref 6.4–8.2)
PROT UR STRIP-MCNC: 30 MG/DL
PROTHROMBIN TIME: 12.5 SEC (ref 9–11.1)
PROTHROMBIN TIME: 14 SEC (ref 9–11.1)
Q-T INTERVAL, ECG07: 396 MS
QRS DURATION, ECG06: 106 MS
QTC CALCULATION (BEZET), ECG08: 484 MS
RBC # BLD AUTO: 3.52 M/UL (ref 4.1–5.7)
RBC # BLD AUTO: 3.95 M/UL (ref 4.1–5.7)
RBC # BLD AUTO: 4.61 M/UL (ref 4.1–5.7)
RBC #/AREA URNS HPF: ABNORMAL /HPF (ref 0–5)
RBC CASTS URNS QL MICRO: ABNORMAL /LPF
RBC MORPH BLD: ABNORMAL
RBC MORPH BLD: ABNORMAL
SAMPLES BEING HELD,HOLD: NORMAL
SAMPLES BEING HELD,HOLD: NORMAL
SAO2 % BLD: 100 % (ref 92–97)
SAO2 % BLD: 100 % (ref 92–97)
SAO2 % BLD: 100 % (ref 95–99)
SAO2 % BLD: 89 % (ref 92–97)
SAO2 % BLD: 97 % (ref 92–97)
SAO2 % BLD: 99 % (ref 92–97)
SAO2% DEVICE SAO2% SENSOR NAME: ABNORMAL
SARS-COV-2 RDRP RESP QL NAA+PROBE: NOT DETECTED
SERVICE CMNT-IMP: ABNORMAL
SERVICE CMNT-IMP: NORMAL
SODIUM BLD-SCNC: 137 MMOL/L (ref 136–145)
SODIUM SERPL-SCNC: 138 MMOL/L (ref 136–145)
SODIUM SERPL-SCNC: 140 MMOL/L (ref 136–145)
SODIUM SERPL-SCNC: 140 MMOL/L (ref 136–145)
SOURCE, COVRS: NORMAL
SP GR UR REFRACTOMETRY: >1.03 (ref 1–1.03)
SPECIMEN SITE: ABNORMAL
SPECIMEN TYPE: ABNORMAL
SPERM URNS QL MICRO: PRESENT
T4 FREE SERPL-MCNC: 0.8 NG/DL (ref 0.8–1.5)
THERAPEUTIC RANGE,PTTT: ABNORMAL SECS (ref 58–77)
TROPONIN I SERPL HS-MCNC: 22 NG/L (ref 0–76)
TROPONIN I SERPL HS-MCNC: 8856 NG/L (ref 0–76)
TSH SERPL DL<=0.05 MIU/L-ACNC: 1.14 UIU/ML (ref 0.36–3.74)
UA: UC IF INDICATED,UAUC: ABNORMAL
UROBILINOGEN UR QL STRIP.AUTO: 0.2 EU/DL (ref 0.2–1)
VENTILATION MODE VENT: ABNORMAL
VENTRICULAR RATE, ECG03: 90 BPM
VT SETTING VENT: 450
VT SETTING VENT: 570 ML
WBC # BLD AUTO: 16.7 K/UL (ref 4.1–11.1)
WBC # BLD AUTO: 7.5 K/UL (ref 4.1–11.1)
WBC # BLD AUTO: 8.4 K/UL (ref 4.1–11.1)
WBC CASTS URNS QL MICRO: ABNORMAL /LPF
WBC MORPH BLD: ABNORMAL
WBC URNS QL MICRO: ABNORMAL /HPF (ref 0–4)
YEAST URNS QL MICRO: PRESENT

## 2023-01-01 PROCEDURE — 83605 ASSAY OF LACTIC ACID: CPT

## 2023-01-01 PROCEDURE — 80076 HEPATIC FUNCTION PANEL: CPT

## 2023-01-01 PROCEDURE — 84100 ASSAY OF PHOSPHORUS: CPT

## 2023-01-01 PROCEDURE — 74011000250 HC RX REV CODE- 250: Performed by: INTERNAL MEDICINE

## 2023-01-01 PROCEDURE — P9047 ALBUMIN (HUMAN), 25%, 50ML: HCPCS | Performed by: INTERNAL MEDICINE

## 2023-01-01 PROCEDURE — 86900 BLOOD TYPING SEROLOGIC ABO: CPT

## 2023-01-01 PROCEDURE — 74011250636 HC RX REV CODE- 250/636: Performed by: NURSE PRACTITIONER

## 2023-01-01 PROCEDURE — 96374 THER/PROPH/DIAG INJ IV PUSH: CPT

## 2023-01-01 PROCEDURE — 74011000258 HC RX REV CODE- 258: Performed by: INTERNAL MEDICINE

## 2023-01-01 PROCEDURE — 94668 MNPJ CHEST WALL SBSQ: CPT

## 2023-01-01 PROCEDURE — 95717 EEG PHYS/QHP 2-12 HR W/O VID: CPT | Performed by: PSYCHIATRY & NEUROLOGY

## 2023-01-01 PROCEDURE — 82962 GLUCOSE BLOOD TEST: CPT

## 2023-01-01 PROCEDURE — 84484 ASSAY OF TROPONIN QUANT: CPT

## 2023-01-01 PROCEDURE — 82947 ASSAY GLUCOSE BLOOD QUANT: CPT

## 2023-01-01 PROCEDURE — 85025 COMPLETE CBC W/AUTO DIFF WBC: CPT

## 2023-01-01 PROCEDURE — 83690 ASSAY OF LIPASE: CPT

## 2023-01-01 PROCEDURE — 94003 VENT MGMT INPAT SUBQ DAY: CPT

## 2023-01-01 PROCEDURE — C9113 INJ PANTOPRAZOLE SODIUM, VIA: HCPCS | Performed by: INTERNAL MEDICINE

## 2023-01-01 PROCEDURE — 96361 HYDRATE IV INFUSION ADD-ON: CPT

## 2023-01-01 PROCEDURE — 85730 THROMBOPLASTIN TIME PARTIAL: CPT

## 2023-01-01 PROCEDURE — 96367 TX/PROPH/DG ADDL SEQ IV INF: CPT

## 2023-01-01 PROCEDURE — 95816 EEG AWAKE AND DROWSY: CPT | Performed by: INTERNAL MEDICINE

## 2023-01-01 PROCEDURE — 87040 BLOOD CULTURE FOR BACTERIA: CPT

## 2023-01-01 PROCEDURE — 82550 ASSAY OF CK (CPK): CPT

## 2023-01-01 PROCEDURE — 84443 ASSAY THYROID STIM HORMONE: CPT

## 2023-01-01 PROCEDURE — 99285 EMERGENCY DEPT VISIT HI MDM: CPT

## 2023-01-01 PROCEDURE — 94002 VENT MGMT INPAT INIT DAY: CPT

## 2023-01-01 PROCEDURE — 74011000258 HC RX REV CODE- 258: Performed by: STUDENT IN AN ORGANIZED HEALTH CARE EDUCATION/TRAINING PROGRAM

## 2023-01-01 PROCEDURE — 82803 BLOOD GASES ANY COMBINATION: CPT

## 2023-01-01 PROCEDURE — 03HY32Z INSERTION OF MONITORING DEVICE INTO UPPER ARTERY, PERCUTANEOUS APPROACH: ICD-10-PCS | Performed by: INTERNAL MEDICINE

## 2023-01-01 PROCEDURE — 93306 TTE W/DOPPLER COMPLETE: CPT

## 2023-01-01 PROCEDURE — 74011250636 HC RX REV CODE- 250/636: Performed by: INTERNAL MEDICINE

## 2023-01-01 PROCEDURE — 70450 CT HEAD/BRAIN W/O DYE: CPT

## 2023-01-01 PROCEDURE — 83735 ASSAY OF MAGNESIUM: CPT

## 2023-01-01 PROCEDURE — 71045 X-RAY EXAM CHEST 1 VIEW: CPT

## 2023-01-01 PROCEDURE — 80053 COMPREHEN METABOLIC PANEL: CPT

## 2023-01-01 PROCEDURE — 85018 HEMOGLOBIN: CPT

## 2023-01-01 PROCEDURE — 0BH17EZ INSERTION OF ENDOTRACHEAL AIRWAY INTO TRACHEA, VIA NATURAL OR ARTIFICIAL OPENING: ICD-10-PCS | Performed by: INTERNAL MEDICINE

## 2023-01-01 PROCEDURE — 74011000250 HC RX REV CODE- 250: Performed by: STUDENT IN AN ORGANIZED HEALTH CARE EDUCATION/TRAINING PROGRAM

## 2023-01-01 PROCEDURE — 77030041175 HC BAG DIGNSHLD BARD -A

## 2023-01-01 PROCEDURE — 85384 FIBRINOGEN ACTIVITY: CPT

## 2023-01-01 PROCEDURE — 93005 ELECTROCARDIOGRAM TRACING: CPT

## 2023-01-01 PROCEDURE — 82248 BILIRUBIN DIRECT: CPT

## 2023-01-01 PROCEDURE — 65610000006 HC RM INTENSIVE CARE

## 2023-01-01 PROCEDURE — 96375 TX/PRO/DX INJ NEW DRUG ADDON: CPT

## 2023-01-01 PROCEDURE — 36600 WITHDRAWAL OF ARTERIAL BLOOD: CPT

## 2023-01-01 PROCEDURE — 82150 ASSAY OF AMYLASE: CPT

## 2023-01-01 PROCEDURE — 96376 TX/PRO/DX INJ SAME DRUG ADON: CPT

## 2023-01-01 PROCEDURE — 81001 URINALYSIS AUTO W/SCOPE: CPT

## 2023-01-01 PROCEDURE — 96365 THER/PROPH/DIAG IV INF INIT: CPT

## 2023-01-01 PROCEDURE — 36415 COLL VENOUS BLD VENIPUNCTURE: CPT

## 2023-01-01 PROCEDURE — 94667 MNPJ CHEST WALL 1ST: CPT

## 2023-01-01 PROCEDURE — 74011250637 HC RX REV CODE- 250/637: Performed by: NURSE PRACTITIONER

## 2023-01-01 PROCEDURE — 74011250636 HC RX REV CODE- 250/636: Performed by: STUDENT IN AN ORGANIZED HEALTH CARE EDUCATION/TRAINING PROGRAM

## 2023-01-01 PROCEDURE — 77030013256 HC HEADBAND EEG - F

## 2023-01-01 PROCEDURE — 87077 CULTURE AEROBIC IDENTIFY: CPT

## 2023-01-01 PROCEDURE — 75810000455 HC PLCMT CENT VENOUS CATH LVL 2 5182

## 2023-01-01 PROCEDURE — 5A1945Z RESPIRATORY VENTILATION, 24-96 CONSECUTIVE HOURS: ICD-10-PCS | Performed by: INTERNAL MEDICINE

## 2023-01-01 PROCEDURE — 87070 CULTURE OTHR SPECIMN AEROBIC: CPT

## 2023-01-01 PROCEDURE — 74011000250 HC RX REV CODE- 250: Performed by: NURSE PRACTITIONER

## 2023-01-01 PROCEDURE — 80307 DRUG TEST PRSMV CHEM ANLYZR: CPT

## 2023-01-01 PROCEDURE — 84145 PROCALCITONIN (PCT): CPT

## 2023-01-01 PROCEDURE — 95819 EEG AWAKE AND ASLEEP: CPT | Performed by: PSYCHIATRY & NEUROLOGY

## 2023-01-01 PROCEDURE — 84439 ASSAY OF FREE THYROXINE: CPT

## 2023-01-01 PROCEDURE — 87186 SC STD MICRODIL/AGAR DIL: CPT

## 2023-01-01 PROCEDURE — 85027 COMPLETE CBC AUTOMATED: CPT

## 2023-01-01 PROCEDURE — 82977 ASSAY OF GGT: CPT

## 2023-01-01 PROCEDURE — 83036 HEMOGLOBIN GLYCOSYLATED A1C: CPT

## 2023-01-01 PROCEDURE — 77030041174 HC STOOL COL SYS DIGNSHLD BARD -C

## 2023-01-01 PROCEDURE — P9045 ALBUMIN (HUMAN), 5%, 250 ML: HCPCS | Performed by: NURSE PRACTITIONER

## 2023-01-01 PROCEDURE — 94640 AIRWAY INHALATION TREATMENT: CPT

## 2023-01-01 PROCEDURE — 85610 PROTHROMBIN TIME: CPT

## 2023-01-01 PROCEDURE — 87635 SARS-COV-2 COVID-19 AMP PRB: CPT

## 2023-01-01 PROCEDURE — 95706 EEG WO VID 2-12HR INTMT MNTR: CPT | Performed by: NURSE PRACTITIONER

## 2023-01-01 RX ORDER — NOREPINEPHRINE BITARTRATE/D5W 8 MG/250ML
.5-2 PLASTIC BAG, INJECTION (ML) INTRAVENOUS
Status: DISCONTINUED | OUTPATIENT
Start: 2023-01-01 | End: 2023-01-01

## 2023-01-01 RX ORDER — INSULIN LISPRO 100 [IU]/ML
INJECTION, SOLUTION INTRAVENOUS; SUBCUTANEOUS EVERY 6 HOURS
Status: DISCONTINUED | OUTPATIENT
Start: 2023-01-01 | End: 2023-01-01 | Stop reason: HOSPADM

## 2023-01-01 RX ORDER — CALCIUM GLUCONATE 20 MG/ML
1 INJECTION, SOLUTION INTRAVENOUS
Status: COMPLETED | OUTPATIENT
Start: 2023-01-01 | End: 2023-03-03

## 2023-01-01 RX ORDER — SODIUM CHLORIDE 450 MG/100ML
75 INJECTION, SOLUTION INTRAVENOUS CONTINUOUS
Status: DISCONTINUED | OUTPATIENT
Start: 2023-01-01 | End: 2023-03-03

## 2023-01-01 RX ORDER — CALCIUM GLUCONATE 20 MG/ML
2 INJECTION, SOLUTION INTRAVENOUS ONCE
Status: DISCONTINUED | OUTPATIENT
Start: 2023-01-01 | End: 2023-01-01

## 2023-01-01 RX ORDER — HEPARIN SODIUM 5000 [USP'U]/ML
5000 INJECTION, SOLUTION INTRAVENOUS; SUBCUTANEOUS EVERY 8 HOURS
Status: DISCONTINUED | OUTPATIENT
Start: 2023-01-01 | End: 2023-01-01 | Stop reason: HOSPADM

## 2023-01-01 RX ORDER — MIDAZOLAM HYDROCHLORIDE 5 MG/ML
5 INJECTION INTRAMUSCULAR; INTRAVENOUS
Status: DISCONTINUED | OUTPATIENT
Start: 2023-01-01 | End: 2023-01-01

## 2023-01-01 RX ORDER — FENTANYL CITRATE 50 UG/ML
100 INJECTION, SOLUTION INTRAMUSCULAR; INTRAVENOUS
Status: DISCONTINUED | OUTPATIENT
Start: 2023-01-01 | End: 2023-01-01

## 2023-01-01 RX ORDER — MAGNESIUM SULFATE HEPTAHYDRATE 40 MG/ML
2 INJECTION, SOLUTION INTRAVENOUS ONCE
Status: COMPLETED | OUTPATIENT
Start: 2023-01-01 | End: 2023-01-01

## 2023-01-01 RX ORDER — POTASSIUM CHLORIDE 29.8 MG/ML
20 INJECTION INTRAVENOUS ONCE
Status: COMPLETED | OUTPATIENT
Start: 2023-01-01 | End: 2023-01-01

## 2023-01-01 RX ORDER — NOREPINEPHRINE BITARTRATE/D5W 8 MG/250ML
.5-2 PLASTIC BAG, INJECTION (ML) INTRAVENOUS
Status: DISCONTINUED | OUTPATIENT
Start: 2023-01-01 | End: 2023-01-01 | Stop reason: HOSPADM

## 2023-01-01 RX ORDER — NOREPINEPHRINE BITARTRATE/D5W 8 MG/250ML
.5-3 PLASTIC BAG, INJECTION (ML) INTRAVENOUS
Status: DISCONTINUED | OUTPATIENT
Start: 2023-01-01 | End: 2023-03-05 | Stop reason: HOSPADM

## 2023-01-01 RX ORDER — EPINEPHRINE 0.1 MG/ML
INJECTION INTRACARDIAC; INTRAVENOUS
Status: COMPLETED | OUTPATIENT
Start: 2023-01-01 | End: 2023-01-01

## 2023-01-01 RX ORDER — SODIUM BICARBONATE 1 MEQ/ML
SYRINGE (ML) INTRAVENOUS
Status: COMPLETED | OUTPATIENT
Start: 2023-01-01 | End: 2023-01-01

## 2023-01-01 RX ORDER — ALBUTEROL SULFATE 0.83 MG/ML
2.5 SOLUTION RESPIRATORY (INHALATION)
Status: DISCONTINUED | OUTPATIENT
Start: 2023-01-01 | End: 2023-03-05 | Stop reason: HOSPADM

## 2023-01-01 RX ORDER — ALBUMIN HUMAN 50 G/1000ML
25 SOLUTION INTRAVENOUS ONCE
Status: COMPLETED | OUTPATIENT
Start: 2023-01-01 | End: 2023-01-01

## 2023-01-01 RX ORDER — ALBUMIN HUMAN 250 G/1000ML
25 SOLUTION INTRAVENOUS ONCE
Status: COMPLETED | OUTPATIENT
Start: 2023-01-01 | End: 2023-03-03

## 2023-01-01 RX ORDER — DEXTROSE MONOHYDRATE 100 MG/ML
0-250 INJECTION, SOLUTION INTRAVENOUS AS NEEDED
Status: DISCONTINUED | OUTPATIENT
Start: 2023-01-01 | End: 2023-01-01 | Stop reason: HOSPADM

## 2023-01-01 RX ORDER — MAGNESIUM SULFATE 1 G/100ML
1 INJECTION INTRAVENOUS ONCE
Status: COMPLETED | OUTPATIENT
Start: 2023-01-01 | End: 2023-03-03

## 2023-01-01 RX ORDER — IBUPROFEN 200 MG
4 TABLET ORAL AS NEEDED
Status: DISCONTINUED | OUTPATIENT
Start: 2023-01-01 | End: 2023-01-01 | Stop reason: HOSPADM

## 2023-01-01 RX ORDER — SODIUM CHLORIDE 450 MG/100ML
500 INJECTION, SOLUTION INTRAVENOUS ONCE
Status: COMPLETED | OUTPATIENT
Start: 2023-01-01 | End: 2023-01-01

## 2023-01-01 RX ORDER — CHLORHEXIDINE GLUCONATE 0.12 MG/ML
15 RINSE ORAL EVERY 12 HOURS
Status: DISCONTINUED | OUTPATIENT
Start: 2023-01-01 | End: 2023-01-01 | Stop reason: HOSPADM

## 2023-01-01 RX ORDER — BALSAM PERU/CASTOR OIL
OINTMENT (GRAM) TOPICAL 2 TIMES DAILY
Status: DISCONTINUED | OUTPATIENT
Start: 2023-01-01 | End: 2023-01-01 | Stop reason: HOSPADM

## 2023-01-01 RX ORDER — VANCOMYCIN 2 GRAM/500 ML IN 0.9 % SODIUM CHLORIDE INTRAVENOUS
2000 ONCE
Status: COMPLETED | OUTPATIENT
Start: 2023-01-01 | End: 2023-01-01

## 2023-01-01 RX ORDER — FUROSEMIDE 10 MG/ML
20 INJECTION INTRAMUSCULAR; INTRAVENOUS ONCE
Status: COMPLETED | OUTPATIENT
Start: 2023-03-03 | End: 2023-03-03

## 2023-01-01 RX ADMIN — ALBUMIN (HUMAN) 25 G: 12.5 INJECTION, SOLUTION INTRAVENOUS at 05:50

## 2023-01-01 RX ADMIN — VANCOMYCIN HYDROCHLORIDE 1000 MG: 1 INJECTION, POWDER, LYOPHILIZED, FOR SOLUTION INTRAVENOUS at 04:35

## 2023-01-01 RX ADMIN — CHLORHEXIDINE GLUCONATE 15 ML: 1.2 RINSE ORAL at 02:56

## 2023-01-01 RX ADMIN — NOREPINEPHRINE BITARTRATE 4 MCG/MIN: 1 SOLUTION INTRAVENOUS at 13:44

## 2023-01-01 RX ADMIN — HEPARIN SODIUM 5000 UNITS: 5000 INJECTION INTRAVENOUS; SUBCUTANEOUS at 11:05

## 2023-01-01 RX ADMIN — CASTOR OIL AND BALSAM, PERU: 788; 87 OINTMENT TOPICAL at 08:43

## 2023-01-01 RX ADMIN — SODIUM CHLORIDE 100 MG/HR: 900 INJECTION INTRAVENOUS at 22:38

## 2023-01-01 RX ADMIN — VASOPRESSIN 0.03 UNITS/MIN: 20 INJECTION PARENTERAL at 16:42

## 2023-01-01 RX ADMIN — PIPERACILLIN AND TAZOBACTAM 3.38 G: 3; .375 INJECTION, POWDER, FOR SOLUTION INTRAVENOUS at 16:15

## 2023-01-01 RX ADMIN — EPINEPHRINE 1 MG: 0.1 INJECTION, SOLUTION ENDOTRACHEAL; INTRACARDIAC; INTRAVENOUS at 13:33

## 2023-01-01 RX ADMIN — PIPERACILLIN AND TAZOBACTAM 3.38 G: 3; .375 INJECTION, POWDER, FOR SOLUTION INTRAVENOUS at 08:36

## 2023-01-01 RX ADMIN — LEVOTHYROXINE SODIUM ANHYDROUS 20 MCG/HR: 100 INJECTION, POWDER, LYOPHILIZED, FOR SOLUTION INTRAVENOUS at 21:21

## 2023-01-01 RX ADMIN — VASOPRESSIN 0.03 UNITS/MIN: 20 INJECTION PARENTERAL at 02:22

## 2023-01-01 RX ADMIN — PIPERACILLIN AND TAZOBACTAM 4.5 G: 4; .5 INJECTION, POWDER, FOR SOLUTION INTRAVENOUS at 21:59

## 2023-01-01 RX ADMIN — SODIUM CHLORIDE, POTASSIUM CHLORIDE, SODIUM LACTATE AND CALCIUM CHLORIDE 1000 ML: 600; 310; 30; 20 INJECTION, SOLUTION INTRAVENOUS at 23:45

## 2023-01-01 RX ADMIN — ALBUMIN (HUMAN) 25 G: 0.25 INJECTION, SOLUTION INTRAVENOUS at 22:45

## 2023-01-01 RX ADMIN — Medication 1 AMPULE: at 08:43

## 2023-01-01 RX ADMIN — SODIUM BICARBONATE 50 MEQ: 84 INJECTION, SOLUTION INTRAVENOUS at 13:37

## 2023-01-01 RX ADMIN — Medication 1 AMPULE: at 02:56

## 2023-01-01 RX ADMIN — SODIUM CHLORIDE, POTASSIUM CHLORIDE, SODIUM LACTATE AND CALCIUM CHLORIDE 500 ML: 600; 310; 30; 20 INJECTION, SOLUTION INTRAVENOUS at 22:15

## 2023-01-01 RX ADMIN — SODIUM CHLORIDE 1000 MG: 900 INJECTION INTRAVENOUS at 21:26

## 2023-01-01 RX ADMIN — SODIUM BICARBONATE: 84 INJECTION, SOLUTION INTRAVENOUS at 14:11

## 2023-01-01 RX ADMIN — PIPERACILLIN AND TAZOBACTAM 4.5 G: 4; .5 INJECTION, POWDER, FOR SOLUTION INTRAVENOUS at 15:43

## 2023-01-01 RX ADMIN — Medication 60 MCG/MIN: at 16:58

## 2023-01-01 RX ADMIN — Medication 4 MCG/MIN: at 19:44

## 2023-01-01 RX ADMIN — ALBUTEROL SULFATE 2.5 MG: 2.5 SOLUTION RESPIRATORY (INHALATION) at 21:37

## 2023-01-01 RX ADMIN — CHLORHEXIDINE GLUCONATE 15 ML: 1.2 RINSE ORAL at 08:44

## 2023-01-01 RX ADMIN — SODIUM CHLORIDE 500 ML: 4.5 INJECTION, SOLUTION INTRAVENOUS at 22:41

## 2023-01-01 RX ADMIN — PIPERACILLIN AND TAZOBACTAM 3.38 G: 3; .375 INJECTION, POWDER, FOR SOLUTION INTRAVENOUS at 00:30

## 2023-01-01 RX ADMIN — SODIUM CHLORIDE 75 ML/HR: 4.5 INJECTION, SOLUTION INTRAVENOUS at 20:27

## 2023-01-01 RX ADMIN — Medication 60 MCG/MIN: at 22:18

## 2023-01-01 RX ADMIN — SODIUM CHLORIDE 500 ML: 900 INJECTION, SOLUTION INTRAVENOUS at 13:39

## 2023-01-01 RX ADMIN — Medication 60 MCG/MIN: at 19:32

## 2023-01-01 RX ADMIN — VANCOMYCIN HYDROCHLORIDE 2000 MG: 10 INJECTION, POWDER, LYOPHILIZED, FOR SOLUTION INTRAVENOUS at 16:11

## 2023-01-01 RX ADMIN — PANTOPRAZOLE SODIUM 40 MG: 40 INJECTION, POWDER, LYOPHILIZED, FOR SOLUTION INTRAVENOUS at 11:05

## 2023-01-01 RX ADMIN — MAGNESIUM SULFATE HEPTAHYDRATE 2 G: 40 INJECTION, SOLUTION INTRAVENOUS at 05:54

## 2023-01-01 RX ADMIN — MAGNESIUM SULFATE HEPTAHYDRATE 1 G: 1 INJECTION, SOLUTION INTRAVENOUS at 23:00

## 2023-01-01 RX ADMIN — Medication 75 MCG/MIN: at 17:15

## 2023-01-01 RX ADMIN — Medication 15 MCG/MIN: at 13:51

## 2023-01-01 RX ADMIN — SODIUM BICARBONATE 50 MEQ: 84 INJECTION, SOLUTION INTRAVENOUS at 13:34

## 2023-01-01 RX ADMIN — CALCIUM GLUCONATE 1000 MG: 20 INJECTION, SOLUTION INTRAVENOUS at 23:37

## 2023-01-01 RX ADMIN — POTASSIUM CHLORIDE 20 MEQ: 29.8 INJECTION, SOLUTION INTRAVENOUS at 22:50

## 2023-01-01 RX ADMIN — SODIUM CHLORIDE 1000 ML: 9 INJECTION, SOLUTION INTRAVENOUS at 13:59

## 2023-03-01 PROBLEM — R55 CONVULSIVE SYNCOPE: Status: ACTIVE | Noted: 2023-01-01

## 2023-03-01 PROBLEM — G93.1 SEVERE ANOXIC-ISCHEMIC ENCEPHALOPATHY (HCC): Status: ACTIVE | Noted: 2023-01-01

## 2023-03-01 PROBLEM — I67.82 SEVERE ANOXIC-ISCHEMIC ENCEPHALOPATHY (HCC): Status: ACTIVE | Noted: 2023-01-01

## 2023-03-01 PROBLEM — T50.901A OVERDOSE: Status: ACTIVE | Noted: 2023-01-01

## 2023-03-01 NOTE — ED NOTES
Patients  is at the bedside- was trying to go to ct but family is requesting we hold off for prayers.

## 2023-03-01 NOTE — ED NOTES
Pts bp and sats continue to drop- icu dc, rapid response rn and respiratory at the bedside- bagging the patient

## 2023-03-01 NOTE — PROGRESS NOTES
Spiritual Care Assessment/Progress Note  [facility]      NAME: Stephanie Roman      MRN: [id number]  AGE: 29 y.o. SEX: male  Taoist Affiliation: No Sikh   Language: English     3/1/2023     Total Time (in minutes): 40     Spiritual Assessment begun in hospitals EMERGENCY DEPT through conversation with:         []Patient        [x] Family    [x] Friend(s)        Reason for Consult: Emergency Department visit     Spiritual beliefs: (Please include comment if needed)     [x] Identifies with a mark tradition:         [x] Supported by a mark community:            [] Claims no spiritual orientation:           [] Seeking spiritual identity:                [] Adheres to an individual form of spirituality:           [] Not able to assess:                           Identified resources for coping:      [x] Prayer                               [] Music                  [] Guided Imagery     [x] Family/friends                 [] Pet visits     [] Devotional reading                         [] Unknown     [] Other:                                               Interventions offered during this visit: (See comments for more details)    Patient Interventions: Prayer (actual)     Family/Friend(s):  Affirmation of emotions/emotional suffering, Affirmation of mark, Prayer (actual), Prayer (assurance of), Normalization of emotional/spiritual concerns     Plan of Care:     [x] Support spiritual and/or cultural needs    [] Support AMD and/or advance care planning process      [] Support grieving process   [] Coordinate Rites and/or Rituals    [] Coordination with community clergy   [] No spiritual needs identified at this time   [] Detailed Plan of Care below (See Comments)  [] Make referral to Music Therapy  [] Make referral to Pet Therapy     [] Make referral to Addiction services  [] Make referral to [facility]  [] Make referral to Spiritual Care Partner  [] No future visits requested        [x] Contact Spiritual Care for further referrals     Comments:  received an on call page from [facility]. She stated Mr. Marlene Witt is in cardiac arrest and his family is requesting a .  coordinated services with the nurse and the doctor.  met with the family (mother, father, friend and girlfriend). His mother stated she called Father Darius Grayson and he placed her in touch with Father Tanya Guardado. She stated Father Tanya Guardado is on his way to the hospital. Mr. Jordyn Garcia father asked the  to please pray for his son now because there is never too much prayer.  provided a presence, prayer, encouragement, empathetic listening and refreshments.  advised the nurse to page if further assistance in needed.  services are available 24 hours a day as requested. Rev. OLE Ziegler.  [address] [facility]   Paging Service [phone])

## 2023-03-01 NOTE — H&P
[facility] INITIAL ASSESSMENT. Name: Isidro Louis   : 1990   MRN: [id number]   Date: 3/1/2023        Chief Complaint   Patient presents with    Cardiac arrest     Pt was found down by his girlfriend who went out for a walk and when she returned he was  down. Apparently he has a history of drug use- his gf gave him 8 narcan and called ems. Compressions were started and rosc was obtained for 1 minute and then he went asystole again. He received 300 amio, 7 rounds of epi, 4 mg narcan and was shocked twice for vtach. Per ems he lost his pulse again about 20 minutes ago. HPI:   HPI is limited. No family available bedside. Per ER, the patient was found down by girlfriend. His exact downtime was unknown. She gave him Narcan and called EMS. He was pulseless upon their arrival.  They achieved ROSC X2 in the field and then lost pulse again en route. He was intubated bu EMS. ROSC was achieved a third time in the ER. He was started on moderate dose NE. Drug screen + methadone/opiates /THC. He had a profound respiratory acidosis 6.82/98/158. Assessment/Plan:   Cardiac arrest  Presumed opioid overdose - Relapse per girlfriend  - Stat head CT / EEG, presumed anoxic brain injury  - Avoid hyperthermia, Cooling for temp >36.5    Hypoxic / hypercapnic respiratory failure  Aspiration   - send sputum cx / procal   - broad spectrum abx    Shock   Cardiogenic / volume depletion / sepsis    - Continue pressor support  - Echo ordered     Next of kin: mother Piedad Jason    I personally spent 120 minutes of critical care time. This is time spent at this critically ill patient's bedside actively involved in patient care as well as the coordination of care and discussions with the patient's family. This does not include any procedural time which has been billed separately. Addendum - Patient had an acute episode of rapidly increased vasopressor need and hypoxia.   Bedside echo with global hypokinesis. Called US for echo again. CXR without change. Spoke with family. They agreed to DNR given the grave prognosis. Review of systems:     ROS   Unable to obtain     Objective:     Vital Signs:  Visit Vitals  BP (!) 61/43   Pulse 97   Resp 26   SpO2 95%        No data recorded. Intake/Output:   No intake or output data in the 24 hours ending 03/01/23 1551      Physical Exam  Constitutional:       Appearance: He is ill-appearing. Comments: Vomit in nares and mouth   Eyes:      Comments: Fixed and dilated    Neck:      Comments: Cervical collar in place  Cardiovascular:      Rate and Rhythm: Regular rhythm. Tachycardia present. Pulses: Normal pulses. Pulmonary:      Breath sounds: Rhonchi present. Comments: Mechanical breath sounds   Abdominal:      General: Abdomen is flat. Bowel sounds are normal.      Palpations: Abdomen is soft. Musculoskeletal:      Right lower leg: No edema. Left lower leg: No edema. Skin:     General: Skin is warm. Neurological:      Comments: Non responsive  (-)gag  (-) cough        Past Medical History:        has a past medical history of Anxiety, Bipolar affective ([address] and Heroin addiction ([facility] [address] Past Surgical History:      has a past surgical history that includes hx other surgical.      Home Medications:     Prior to Admission medications    Medication Sig Start Date End Date Taking? Authorizing Provider   ketorolac (TORADOL) 10 mg tablet Take 1 Tab by mouth every six (6) hours as needed for Pain for up to 10 doses. 4/25/21   Rondell Lesches, PA   diazePAM (Valium) 5 mg tablet Take 1 Tab by mouth every eight (8) hours as needed (spasm/pain) for up to 8 doses. Max Daily Amount: 15 mg. 4/25/21   Rondell Lesches, PA   naloxone [facility]) 4 mg/actuation nasal spray Use 1 spray intranasally, then discard. Repeat with new spray every 2 min as needed for opioid overdose symptoms, alternating nostrils.  4/25/21   Rondell Lesches, [address] methadone HCl (METHADONE PO) Take 240 mg by mouth. Provider, Historical   QUEtiapine (SEROQUEL) 100 mg tablet Take 1 Tab by mouth two (2) times a day. Patient taking differently: Take 100 mg by mouth two (2) times a day. Indications: Pt state he takes 1 tab at night. 2/6/12   Madelyn Torres MD   QUEtiapine (SEROQUEL) 300 mg tablet Take 1 Tab by mouth nightly. 2/6/12   Madelyn Torres MD         Allergies/Social/Family History:     No Known Allergies   Social History     Tobacco Use    Smoking status: Former    Smokeless tobacco: Never   Substance Use Topics    Alcohol use: Not Currently      Family History   Problem Relation Age of Onset    No Known Problems Mother     No Known Problems Father          LABS AND  DATA:   Reviewed      Peak airway pressure: 23 cm H2O    Minute ventilation: 8.7 l/min      CRITICAL CARE CONSULTANT NOTE  I had a face to face encounter with the patient, reviewed and interpreted patient data including clinical events, labs, images, vital signs, I/O's, and examined patient. I have discussed the case and the plan and management of the patient's care with the consulting services, the bedside nurses and the respiratory therapist.      NOTE OF PERSONAL INVOLVEMENT IN CARE   This patient has a high probability of imminent, clinically significant deterioration, which requires the highest level of preparedness to intervene urgently. I participated in the decision-making and personally managed or directed the management of the following life and organ supporting interventions that required my frequent assessment to treat or prevent imminent deterioration.       Farideh Johnston MD   Pulmonary/[facility] Critical Care  [phone]  3/1/2023

## 2023-03-02 NOTE — PROGRESS NOTES
Spiritual Care Assessment/Progress Note  [facility]      NAME: Nasir Plascencia      MRN: [id number]  AGE: 29 y.o. SEX: male  Oriental orthodox Affiliation: No Taoist   Language: English     3/2/2023     Total Time (in minutes): 39     Spiritual Assessment begun in [facility] CRITICAL CARE 1 through conversation with:         []Patient        [x] Family    [x] Friend(s)        Reason for Consult: Request by family/friend(s)     Spiritual beliefs: (Please include comment if needed)     [x] Identifies with a mark tradition:         [] Supported by a mark community:            [] Claims no spiritual orientation:           [] Seeking spiritual identity:                [] Adheres to an individual form of spirituality:           [] Not able to assess:                           Identified resources for coping:      [] Prayer                               [] Music                  [] Guided Imagery     [x] Family/friends                 [] Pet visits     [] Devotional reading                         [] Unknown     [] Other:                                                Interventions offered during this visit: (See comments for more details)    Patient Interventions: Prayer (actual)     Family/Friend(s):  Affirmation of emotions/emotional suffering, Coping skills reviewed/reinforced, Iconic (affirming the presence of God/Higher Power), Normalization of emotional/spiritual concerns, Prayer (assurance of), Oriental orthodox beliefs/image of God discussed, Integration of medical assessment with existing values and beliefs, Catharsis/review of pertinent events in supportive environment, Affirmation of mark, Life review/legacy     Plan of Care:     [] Support spiritual and/or cultural needs    [] Support AMD and/or advance care planning process      [x] Support grieving process   [] Coordinate Rites and/or Rituals    [] Coordination with community clergy   [] No spiritual needs identified at this time   [] Detailed Plan of Care below (See Comments)  [] Make referral to Music Therapy  [] Make referral to Pet Therapy     [] Make referral to Addiction services  [] Make referral to [facility]  [] Make referral to Spiritual Care Partner  [] No future visits requested        [x] Contact Spiritual Care for further referrals     Comments:  Patient's nurse paged that family were requesting to speak to a . Patient was unresponsive at time of visit. Coco Pryor, patient's father, was present.  offered supportive listening presence with empathy as Coco Pryor engaged at [facility] in conversation and life review. Patient has struggled with his condition for a very long time, parents and others been supporting to get him on the right path. Coco Pryor was emotional and tearful, stating he feels terrible. Patient has a good heart and would do anything for any body. Patient's mom, Jamel Pinto, his girlfriend of 12 years, and two other childhood friends came by to visit. They were all very distressed as they shared memories and processed their emotions. When  inquired how he may support, Coco Pryor stated that their  already visited and offered the last rights, and so they were good with that. They expressed no particular need for support. Thoughts and feelings affirmed, assurance of prayer offered. Family assured of continuous  availability. They thanked  for he visit.        Visited by: Paulie montoya: [phone] ([phone])

## 2023-03-02 NOTE — PROGRESS NOTES
1945 Verbal shift change report given to Carlos Ortega RN (CCU) (oncoming nurse) by Wing Sary RN (ED) (offgoing nurse). Report included the following information SBAR, ED Summary, MAR, and Cardiac Rhythm SR .     2005 Informed by CCU Charge Belem this patient will no longer be coming to this room. ONOFRE Patricia speaking with ED to clarify new room assignment. I will no longer be taking over for this patient.     ~2030 Patient reassigned to CCU 2546 and to this RN's care after transfer. Transfer to resume after CT completed. 2115 This RN at bedside in ED to transfer patient to CCU.    ~2130 Patient arrived to CCU 2546. Admission assessment completed. Dual skin assessment completed with ONOFRE Patricia. Moisture breakdown in gluteal cleft. Patient is unresponsive to pain, no withdraw in any extremity to pain, no cough/gag. Pupils are dilated and fixed. 2133 RUE IO line removed. 2145 AIXA Hess at bedside placing R radial arterial line. [address], NP at bedside updating family on CT scan and patient prognosis. 0000 Reassessment completed, no significant changes. 56 Notified AIXA Hess of lack of urine output. No new orders. 3307 Rapid EEG - Ceribel initiated. See ceribel note. [address] [place] with Jaqueline Rudd NP regarding Q6 H&H orders- no need to draw unless unstable on morning labs. 0400 Reassessment completed, no significant changes. 56 Spoke with Jaqueline Rudd NP regarding lab results, including critical lactic and worsening creatinine. Orders for mag repletion received. 5562 Verbal orders to stop Ceribel. Ceribel stopped, see note for details. Seizure burden 0%. 0715 Bedside shift change report given to Neli Gilman (oncoming nurse) by Carlos Ortega RN (offgoing nurse). Report included the following information SBAR, Kardex, ED Summary, Procedure Summary, Intake/Output, MAR, Recent Results, and Cardiac Rhythm ST .     2025 Called Lindy, spoke with Tricia Abbott and Jyoti Hassan.

## 2023-03-02 NOTE — PROCEDURES
[address]  EEG    Name:  Lyn Cota  MR#:  [id number]  :  1990  ACCOUNT #:  [de-identified]  DATE OF SERVICE:  2023      CLINICAL INDICATION:  The patient is a 29-year-old male with a history of possible drug use and abuse with possible convulsive seizure, convulsive syncope, possible anoxic ischemic encephalopathy. EEG to rule out cortical abnormality. EEG CLASSIFICATION:    1. Depression grade 2, generalized. 2.  Dysrhythmia grade II, delta grade I generalized. DESCRIPTION OF THE RECORD:  This is a 16-channel EEG on the patient to assess his brain activity after being found down in cardiac arrest, possible seizures. This study showed a very low amplitude background rhythms with some 2-6 Hz activity seen in the recording in a generalized fashion. Patient did have some electrode artifact seen in the F3 frontal temporal electrode on the left side. No clear areas of focal slowing or spike or spike-and-wave discharges were seen. Stimulation of the patient produced little change in background recording. Photic stimulation produced little change. Hyperventilation could not be performed. The patient did seem to be at times in stages of sleep. INTERPRETATION:  This is a moderately severely abnormal electroencephalogram due to the widespread generalized slowing and very low amplitude of the background rhythms most consistent with widespread and diffuse encephalopathy of toxic metabolic or degenerative type possibly consistent with an anoxic ischemic encephalopathy. No clear focal process and no seizures were seen. Clinical correlation recommended. Krystian Segura MD      [id number]  D:  2023 20:58  T:  2023 22:31  JOB #:  [id number]  CC:   Daisy Swain MD

## 2023-03-02 NOTE — PROGRESS NOTES
Chart reviewed, spoke with Dr. Elizabeth Wong. ICU team has spoken with family and goals are clear; no need for palliative at this time. Will cancel consult, please call with questions.

## 2023-03-02 NOTE — ED NOTES
Patient became hypertensive, spoke to intensivist, he stated that we should come down on levophed by 5mcg/min. Decreased levophed and patient BP dropped significantly, intensivist made aware that levophed was increased back to original rate of 60mcg/min. Per intensivist leave levophed at 61, he would prefer patient to be hypertensive rather than hypotensive.

## 2023-03-02 NOTE — INTERDISCIPLINARY ROUNDS
Interdisciplinary team rounds were held 3/2/2023 with the following team members:Care Management, Diabetes Treatment Specialist, Nursing, Nutrition, Pharmacy, Physician, and Clinical Coordinator and the significant other, father, and mother. Plan of care discussed. See clinical pathway and/or care plan for interventions and desired outcomes. Goals of the Day: [facility] already consulted.

## 2023-03-02 NOTE — ED NOTES
Patient had 3 large liquid bms- obtained verbal order for  FMS per night NP for ICU.  Report given to Casi Morgan and FMS was placed

## 2023-03-02 NOTE — ED PROVIDER NOTES
[facility] EMERGENCY DEPT  EMERGENCY DEPARTMENT ENCOUNTER       Pt Name: Odilon Schmidt  MRN: [id number]  [place] 1990  Date of evaluation: 3/1/2023  Provider: Shaniqua Howe DO   PCP: Lauro Corley MD  Note Started: 8:45 PM 3/1/23     CHIEF COMPLAINT       Chief Complaint   Patient presents with    Cardiac arrest     Pt was found down by his girlfriend who went out for a walk and when she returned he was  down. Apparently he has a history of drug use- his gf gave him 8 narcan and called ems. Compressions were started and rosc was obtained for 1 minute and then he went asystole again. He received 300 amio, 7 rounds of epi, 4 mg narcan and was shocked twice for vtach. Per ems he lost his pulse again about 20 minutes ago. HISTORY OF PRESENT ILLNESS: 1 or more elements      History From: EMS  HPI Limitations : Cardiac Arrest     Odilon Schmidt is a 29 y.o. male who presents in cardiac arrest. EMS reports patient had unknown downtime, was found by girlfriend given 8 mg of intranasal Narcan. EMS administered Narcan, he is given 2 shocks, amiodarone, 7 rounds of epi. They achieved ROSC after approximately 20 minutes for about 1 minute and then patient was in asystole. He had a brief run of V. tach after ROSC reportedly. He reportedly is a IV drug user, they have no additional history. Total time that they have been running the arrest is approximately 40 minutes. Patient intubated with ET tube by EMS. Nursing Notes were all reviewed and agreed with or any disagreements were addressed in the HPI. REVIEW OF SYSTEMS      Review of Systems     Positives and Pertinent negatives as per HPI.     PAST HISTORY     Past Medical History:  Past Medical History:   Diagnosis Date    Anxiety     Bipolar affective ([facility] [address]     Heroin addiction ([facility] [address]        Past Surgical History:  Past Surgical History:   Procedure Laterality Date    HX OTHER SURGICAL      left arm       Family History:  Family History Problem Relation Age of Onset    No Known Problems Mother     No Known Problems Father        Social History:  Social History     Tobacco Use    Smoking status: Former    Smokeless tobacco: Never   Vaping Use    Vaping Use: Every day    Substances: Nicotine, Flavoring, Marijuana    Devices: Disposable, Refillable tank   Substance Use Topics    Alcohol use: Not Currently    Drug use: Yes     Types: Heroin, Marijuana     Comment: Pt state he smoke weed all day untile he goes to bed at night. Allergies:  No Known Allergies    CURRENT MEDICATIONS      Previous Medications    DIAZEPAM (VALIUM) 5 MG TABLET    Take 1 Tab by mouth every eight (8) hours as needed (spasm/pain) for up to 8 doses. Max Daily Amount: 15 mg. KETOROLAC (TORADOL) 10 MG TABLET    Take 1 Tab by mouth every six (6) hours as needed for Pain for up to 10 doses. METHADONE HCL (METHADONE PO)    Take 240 mg by mouth. NALOXONE (NARCAN) 4 MG/ACTUATION NASAL SPRAY    Use 1 spray intranasally, then discard. Repeat with new spray every 2 min as needed for opioid overdose symptoms, alternating nostrils. QUETIAPINE (SEROQUEL) 100 MG TABLET    Take 1 Tab by mouth two (2) times a day. QUETIAPINE (SEROQUEL) 300 MG TABLET    Take 1 Tab by mouth nightly. SCREENINGS               No data recorded         PHYSICAL EXAM      ED Triage Vitals   ED Encounter Vitals Group      BP 03/01/23 1337 (!) 75/39      Pulse (Heart Rate) 03/01/23 1338 74      Resp Rate 03/01/23 1338 18      Temp 03/01/23 1652 (!) 94.1 °F (34.5 °C)      Temp src --       O2 Sat (%) 03/01/23 1338 (!) 81 %      Weight 03/01/23 1836 193 lb 9 oz      Height 03/01/23 1836 6' 0.01\"        Physical Exam  Vitals and nursing note reviewed. HENT:      Head: Normocephalic and atraumatic. Nose:      Comments: Vomit in nose  Cardiovascular:      Comments: asystole  Pulmonary:      Breath sounds: Rhonchi present.       Comments: Bilateral breath sounds  Abdominal:      General: Abdomen is flat. There is no distension. Palpations: Abdomen is soft. Skin:     General: Skin is dry. Comments: cool   Neurological:      Comments: gcs3          DIAGNOSTIC RESULTS   LABS:     Recent Results (from the past 12 hour(s))   GLUCOSE, POC    Collection Time: 03/01/23  1:40 PM   Result Value Ref Range    Glucose (POC) 337 (H) 65 - 117 mg/dL    [facility] by [facility]    TROPONIN-HIGH SENSITIVITY    Collection Time: 03/01/23  1:45 PM   Result Value Ref Range    Troponin-High Sensitivity 22 0 - 76 ng/L   SAMPLES BEING HELD    Collection Time: 03/01/23  1:45 PM   Result Value Ref Range    SAMPLES BEING HELD 1SST,1RED,1LAV     COMMENT        Add-on orders for these samples will be processed based on acceptable specimen integrity and analyte stability, which may vary by analyte. CBC WITH AUTOMATED DIFF    Collection Time: 03/01/23  1:48 PM   Result Value Ref Range    WBC 8.4 4.1 - 11.1 K/uL    RBC 3.52 (L) 4.10 - 5.70 M/uL    HGB 11.1 (L) 12.1 - 17.0 g/dL    HCT 34.9 (L) 36.6 - 50.3 %    MCV 99.1 (H) 80.0 - 99.0 FL    MCH 31.5 26.0 - 34.0 PG    MCHC 31.8 30.0 - 36.5 g/dL    RDW 12.6 11.5 - 14.5 %    PLATELET 585 229 - 106 K/uL    MPV 9.1 8.9 - 12.9 FL    NRBC 0.7 (H) 0  WBC    ABSOLUTE NRBC 0.06 (H) 0.00 - 0.01 K/uL    NEUTROPHILS 14 (L) 32 - 75 %    BAND NEUTROPHILS 4 %    LYMPHOCYTES 72 (H) 12 - 49 %    MONOCYTES 6 5 - 13 %    EOSINOPHILS 0 0 - 7 %    BASOPHILS 0 0 - 1 %    METAMYELOCYTES 1 %    MYELOCYTES 3 %    IMMATURE GRANULOCYTES 0 0.0 - 0.5 %    ABS. NEUTROPHILS 1.5 (L) 1.8 - 8.0 K/UL    ABS. LYMPHOCYTES 6.0 (H) 0.8 - 3.5 K/UL    ABS. MONOCYTES 0.5 0.0 - 1.0 K/UL    ABS. EOSINOPHILS 0.0 0.0 - 0.4 K/UL    ABS. BASOPHILS 0.0 0.0 - 0.1 K/UL    ABS. IMM.  GRANS. 0.0 0.00 - 0.04 K/UL    DF MANUAL      RBC COMMENTS NORMOCYTIC, NORMOCHROMIC      WBC COMMENTS SMUDGE CELLS SEEN     METABOLIC PANEL, COMPREHENSIVE    Collection Time: 03/01/23  1:48 PM   Result Value Ref Range    Sodium 140 136 - 145 mmol/L    Potassium 4.2 3.5 - 5.1 mmol/L    Chloride 96 (L) 97 - 108 mmol/L    CO2 22 21 - 32 mmol/L    Anion gap 22 (H) 5 - 15 mmol/L    Glucose 354 (H) 65 - 100 mg/dL    BUN 18 6 - 20 MG/DL    Creatinine 1.65 (H) 0.70 - 1.30 MG/DL    BUN/Creatinine ratio 11 (L) 12 - 20      eGFR 57 (L) >60 ml/min/1.73m2    Calcium 8.4 (L) 8.5 - 10.1 MG/DL    Bilirubin, total 0.4 0.2 - 1.0 MG/DL    ALT (SGPT) 334 (H) 12 - 78 U/L    AST (SGOT) 277 (H) 15 - 37 U/L    Alk.  phosphatase 68 45 - 117 U/L    Protein, total 5.4 (L) 6.4 - 8.2 g/dL    Albumin 2.8 (L) 3.5 - 5.0 g/dL    Globulin 2.6 2.0 - 4.0 g/dL    A-G Ratio 1.1 1.1 - 2.2     PROTHROMBIN TIME + INR    Collection Time: 03/01/23  1:48 PM   Result Value Ref Range    INR 1.2 (H) 0.9 - 1.1      Prothrombin time 12.5 (H) 9.0 - 11.1 sec   BLOOD TYPE, (ABO+RH)    Collection Time: 03/01/23  1:48 PM   Result Value Ref Range    ABO/Rh(D) A POSITIVE     Comment SAMPLE NOT USABLE FOR CROSSMATCH    BLOOD GAS, ARTERIAL    Collection Time: 03/01/23  2:03 PM   Result Value Ref Range    pH 6.82 (LL) 7.35 - 7.45      PCO2 98 (H) 35 - 45 mmHg    PO2 158 (H) 80 - 100 mmHg    O2 SAT 97 92 - 97 %    BICARBONATE 16 (L) 22 - 26 mmol/L    BASE DEFICIT 20.7 mmol/L    O2 METHOD VENT      Tidal volume 450.0      SET RATE 20      PEEP/CPAP 5.0      Sample source ARTERIAL      SITE RIGHT RADIAL      AUSTEN'S TEST YES      Critical value read back       Called to  [employer] DO on 03/01/2023 at 14:06   URINALYSIS W/ REFLEX CULTURE    Collection Time: 03/01/23  3:12 PM    Specimen: Urine   Result Value Ref Range    Color YELLOW/STRAW      Appearance CLOUDY (A) CLEAR      Specific gravity >1.030 (H) 1.003 - 1.030    pH (UA) 5.5 5.0 - 8.0      Protein 30 (A) NEG mg/dL    Glucose Negative NEG mg/dL    Ketone TRACE (A) NEG mg/dL    Bilirubin Negative NEG      Blood Negative NEG      Urobilinogen 0.2 0.2 - 1.0 EU/dL    Nitrites Negative NEG      Leukocyte Esterase Negative NEG      WBC 5-10 0 - 4 /hpf    RBC 5-10 0 - 5 /hpf    Epithelial cells FEW FEW /lpf    Bacteria 1+ (A) NEG /hpf    UA:UC IF INDICATED CULTURE NOT INDICATED BY UA RESULT CNI      Mucus 3+ (A) NEG /lpf    Granular cast 0-2 (A) NEG /lpf    WBC cast 0-2 (A) NEG /lpf    RBC cast 0-2 (A) NEG /LPF    Yeast PRESENT (A) NEG      Spermatozoa PRESENT      Other: Renal Epithelial cells Present     DRUG SCREEN, URINE    Collection Time: 03/01/23  3:12 PM   Result Value Ref Range    AMPHETAMINES Negative NEG      BARBITURATES Negative NEG      BENZODIAZEPINES Positive (A) NEG      COCAINE Positive (A) NEG      METHADONE Negative NEG      OPIATES Positive (A) NEG      PCP(PHENCYCLIDINE) Negative NEG      THC (TH-CANNABINOL) Positive (A) NEG      Drug screen comment (NOTE)    LACTIC ACID    Collection Time: 03/01/23  3:12 PM   Result Value Ref Range    Lactic acid 10.7 (HH) 0.4 - 2.0 MMOL/L   BLOOD GAS, ARTERIAL    Collection Time: 03/01/23  4:13 PM   Result Value Ref Range    pH 7.22 (LL) 7.35 - 7.45      PCO2 50 (H) 35 - 45 mmHg    PO2 67 (L) 80 - 100 mmHg    O2 SAT 89 (L) 92 - 97 %    BICARBONATE 20 (L) 22 - 26 mmol/L    BASE DEFICIT 8.3 mmol/L    O2 METHOD VENT      FIO2 90 %    MODE ASSIST CONTROL      Tidal volume 450.0      PEEP/CPAP 8.0      Sample source ARTERIAL      SITE LEFT RADIAL      AUSTEN'S TEST NOT APPLICABLE      Critical value read back       Called to José Miguel Dykes MD on 03/01/2023 at 16:21   HGB & HCT    Collection Time: 03/01/23  4:30 PM   Result Value Ref Range    HGB 11.8 (L) 12.1 - 17.0 g/dL    HCT 36.1 (L) 36.6 - 50.3 %   SAMPLES BEING HELD    Collection Time: 03/01/23  4:30 PM   Result Value Ref Range    SAMPLES BEING HELD BLUE, SST, DRK GRN, RED     COMMENT        Add-on orders for these samples will be processed based on acceptable specimen integrity and analyte stability, which may vary by analyte.    GLUCOSE, RANDOM    Collection Time: 03/01/23  4:30 PM   Result Value Ref Range    Glucose 265 (H) 65 - 100 mg/dL ECHO ADULT COMPLETE    Collection Time: 03/01/23  6:38 PM   Result Value Ref Range    IVSd 0.9 0.6 - 1.0 cm    LVIDd 3.9 (A) 4.2 - 5.9 cm    LVIDs 3.4 cm    LVPWd 1.2 (A) 0.6 - 1.0 cm    LVOT Peak Gradient 2 mmHg    LVOT Peak Velocity 0.7 m/s    RV Free Wall Peak S' 8 cm/s    AV Peak Gradient 2 mmHg    AV Peak Velocity 0.8 m/s    MV A Velocity 0.45 m/s    MV E Wave Deceleration Time 109.9 ms    MV E Velocity 0.54 m/s    LV E' Lateral Velocity 9 cm/s    LV E' Septal Velocity 8 cm/s    MV Peak Gradient 1 mmHg    MV Mean Gradient 0 mmHg    MV Max Velocity 0.4 m/s    MV Mean Velocity 0.3 m/s    MV VTI 7.5 cm    TAPSE 1.6 (A) 1.7 cm    Fractional Shortening 2D 13 28 - 44 %    LVIDd Index 1.86 cm/m2    LVIDs Index 1.62 cm/m2    LV RWT Ratio 0.62     LV Mass 2D 131.0 88 - 224 g    LV Mass 2D Index 62.4 49 - 115 g/m2    MV E/A 1.20     E/E' Ratio (Averaged) 6.38     E/E' Lateral 6.00     E/E' Septal 6.75     AV Velocity Ratio 0.88         EKG interpreted by me: afib with RVR, diffuse ST depressions     RADIOLOGY:  Non-plain film images such as CT, Ultrasound and MRI are read by the radiologist. Plain radiographic images are visualized and preliminarily interpreted by the ED Provider with the below findings:       Interpretation per the Radiologist below, if available at the time of this note:     XR CHEST PORT    Result Date: 3/1/2023  INDICATION: . dropping sats Additional history: COMPARISON: Previous chest xray, earlier same day. LIMITATIONS: Portable technique. Tanda Bun FINDINGS: Single frontal view of the chest. . Lines/tubes/surgical: No significant change in the appearance of an ET tube and gastric tube. Heart/mediastinum: Unremarkable. Lungs/pleura: Redemonstrated interstitial and patchy opacities throughout the lungs, slightly progressed in the right base. No visualized pleural effusion or pneumothorax. Additional Comments: None. .    1. Slightly increased opacity in the right base.         XR CHEST PORT    Result Date: 3/1/2023  INDICATION: NG tube placement. Portable AP view of the chest. Direct comparison made to prior chest x-ray dated March 1, 2023. Cardiomediastinal silhouette is stable. NG tube tip and side port overlie the stomach. ET tube tip is 5 cm superior to the level of the shelley. There is no pleural fluid. There is no pneumothorax. There are persistent reticulonodular interstitial markings in both lungs with superimposed patchy airspace disease in the left midlung. Tubes and lines, as described above. XR CHEST PORT    Result Date: 3/1/2023  INDICATION:  post intubation EXAM: Portable chest 1437 hours FINDINGS: Endotracheal tube overlies the trachea at the level of the clavicles. Heart is not enlarged. Pulmonary vasculature is normal. There is reticulonodular airspace disease throughout the lungs with an upper lobe predominance. No pneumothorax or pleural effusion. 1. No complicating features post intubation 2. Diffuse reticular nodular airspace disease favored to be infectious or inflammatory      ECHO ADULT COMPLETE    Result Date: 3/1/2023    Left Ventricle: Moderately reduced left ventricular systolic function with a visually estimated EF of 40 - 45%. Left ventricle size is normal. Normal wall thickness. Moderate global hypokinesis present. PROCEDURES   Unless otherwise noted below, none  Procedures    Procedure Note - Central Line Placement:   8:54 PM  Performed by: Armen Whiting DO    Immediately prior to the procedure, the patient was reevaluated and found suitable for the planned procedure and any planned medications. Immediately prior to the procedure a time out was called to verify the correct patient, procedure, equipment, staff, and marking as appropriate. Area was cleansed with Chlorprep / Prepped and draped in sterile fashion. Landmarks identified. 18 gauge needle with triple lumen catheter was inserted into pt's Right, Femoral Vein with ultrasound guidance.  Line sutured in place; sterile dressing applied. Position: Trendelenburg  Number of attempts: 2, wire would not thread to L fem  Estimated blood loss: 10  The procedure took 16-30 minutes, and pt tolerated well. Procedure Note - Bedside Ultrasound:  8:54 PM  Performed by: heart  US of heart performed at beside, showing squeeze, no obvious RV dilation. The procedure took 1-15 minutes, and pt tolerated well. CRITICAL CARE TIME   I have spent 71 minutes of critical care time in evaluating and treating this patient. This includes time spent at bedside, time with family and decision makers, documentation, review of labs and imaging, and/or consultation with specialists. It does not include time spent on separately billed procedures. This patient presents with a critical illness or injury that acutely impairs one or more vital organ systems such that there is a high probability of imminent or life threatening deterioration in the patient's condition. This case involved decision making of high complexity to assess, manipulate, and support vital organ system failure and/or to prevent further life threatening deterioration of the patient's condition. Failure to initiate these interventions on an urgent basis would likely result in sudden, clinically significant or life threatening deterioration in the patient's condition.     Abnormal findings supporting critical care: cardiac arrest, tachycardia, hypotensiion, lactic acidosis, sepsis  Interventions to support critical care: hemodynamic and repsirtoary monitoring, pressors, antibiotics, ICU consultation  Failure to intervene may result in: shock, coma, death      EMERGENCY DEPARTMENT COURSE and DIFFERENTIAL DIAGNOSIS/MDM   Vitals:    Vitals:    03/01/23 1954 03/01/23 1959 03/01/23 2004 03/01/23 2018   BP: (!) 153/138 (!) 196/175 (!) 82/48    Pulse: (!) 122 (!) 123 (!) 124 (!) 134   Resp: 26 23 26 26   Temp:       SpO2: 99% 98% 99% 97%   Weight:       Height: Patient was given the following medications:  Medications   NOREPINephrine (LEVOPHED) 8 mg in 5% dextrose 250mL (32 mcg/mL) infusion (60 mcg/min IntraVENous Rate Change 3/1/23 2007)   midazolam (VERSED) injection 5 mg (has no administration in time range)   fentaNYL citrate (PF) injection 100 mcg (has no administration in time range)   insulin lispro (HUMALOG) injection (has no administration in time range)   glucose chewable tablet 16 g (has no administration in time range)   glucagon (GLUCAGEN) injection 1 mg (has no administration in time range)   dextrose 10% infusion 0-250 mL (has no administration in time range)   vasopressin (VASOSTRICT) 20 Units in 0.9% sodium chloride 100 mL infusion (0.03 Units/min IntraVENous New Bag 3/1/23 1642)   piperacillin-tazobactam (ZOSYN) 3.375 g in 0.9% sodium chloride (MBP/ADV) 100 mL MBP (has no administration in time range)   dextrose 10% infusion 0-250 mL (has no administration in time range)   vancomycin (VANCOCIN) 1,000 mg in 0.9% sodium chloride 250 mL ([id number]) (has no administration in time range)   EPINEPHrine (ADRENALIN) 0.1 mg/mL syringe (1 mg IntraVENous Given 3/1/23 1333)   sodium bicarbonate 8.4 % (1 mEq/mL) injection (50 mEq IntraVENous Given 3/1/23 1337)   sodium chloride 0.9 % bolus infusion (500 mL IntraVENous New Bag 3/1/23 1339)   sodium chloride 0.9 % bolus infusion 1,000 mL (0 mL IntraVENous Stopped 3/1/23 1543)   vancomycin (VANCOCIN) 2000 mg in  ml infusion (0 mg IntraVENous IV Completed 3/1/23 1700)   piperacillin-tazobactam (ZOSYN) 4.5 g in 0.9% sodium chloride (MBP/ADV) 100 mL MBP (0 g IntraVENous IV Completed 3/1/23 1613)       CONSULTS: (Who and What was discussed)  IP CONSULT TO PALLIATIVE CARE - PROVIDER  IP CONSULT TO CARDIOLOGY    Chronic Conditions: IVDU    Social Determinants affecting Dx or Tx: Patient has a substance abuse problem.      Records Reviewed (source and summary of external notes): Prior medical records revealing rx'd valium, quetiapine    CC/HPI Summary, DDx, ED Course, and Reassessment:  Patient presents pulseless in cardiac arrest.  CPR immediately started and IV attempted for ACLS Medications. DDx: ACS, cardiogenic shock, cardiac Tamponade, Septic shock, tension PTX, PE, Hyperkalemia, hypokalemia, hypoglycemia, acidosis, hypovolemia, hypothermia, hypoxic respiratory failure, drug/toxin toxicity. Arrived at 1330, epi given at 1333, bedside ultrasound on 1333 without cardiac activity. Given amp of bicarb, additional epi given, subsequent pulse check with cardiac activity on the ultrasound, patient with palpable pulse. Initial MAP was 50, O2 sats 81%. Initial EKG done at 1334 A-fib with RVR and diffuse ST depressions. Labs, head CT, norepinephrine drip bicarb drip ordered. Repeat EKG obtained at 1343 with worsening ST depressions consistent with global ischemia. Patient's map at this time in the 46s while nor epi is getting started. Initial pH 6.  7, lactate 17. Repeat gas on vent with pH of 6.82 bicarb of 16. MAP improving on nor epi drip. Central line was placed. [id number] [facility] ED SEPSIS NOTE:     13:45 PM The patient now meets criteria for: Septic Shock. Fluid resuscitation with: Based on patient's history and ability to tolerate IV fluids, patient and/or family/caregiver refuse the 30cc/kg bolus  Due to concern for rapidly advancing infection and deterioration of patient's condition, antibiotics are started STAT and cultures ordered. I've performed a sepsis reassessment of the patient's clinical volume status and tissue perfusion at time 8:54 PM    spoke to patient's girlfriend, patient has history of IV drug use, she went out to walk the dog and 30 minutes later when she returned patient was on the ground not breathing. She states he got out of half-way 2 days ago. She is unsure what he may have used.     14:53 Spoke to ICU for admission      Disposition Considerations (Tests not done, Shared Decision Making, Pt Expectation of Test or Tx.):      FINAL IMPRESSION     1. Convulsive syncope Miladis.Has (ICD-10-CM)]    2. Severe anoxic-ischemic encephalopathy (HCC) [G93.1, I67.82 (ICD-10-CM)]    3. Anxiety [F41.9 (ICD-10-CM)]    4. Opiate withdrawal (HCC) [F11.93 (ICD-10-CM)]    5. Cardiac arrest [facility])          DISPOSITION/PLAN   Admitted         PATIENT REFERRED TO:  Follow-up Information    None           DISCHARGE MEDICATIONS:  Current Discharge Medication List            DISCONTINUED MEDICATIONS:  Current Discharge Medication List            (Please note that parts of this dictation were completed with voice recognition software. Quite often unanticipated grammatical, syntax, homophones, and other interpretive errors are inadvertently transcribed by the computer software. Please disregards these errors.  Please excuse any errors that have escaped final proofreading.)    Brandy Yu, DO

## 2023-03-02 NOTE — PROGRESS NOTES
Headband: removed  Date/Time: 3/2/23 0443  Recorder: recording stopped  Skin: Intact and red under electrodes  Highest Seizure [place] Percentage past hour: 0%      General info regarding Seizure [place] %:  Minimum duration of study is 2 hours. If Seizure [place] has remained 0% throughout the entire 2-hour duration, communicate with provider to stop the recording. Seizure [place] 0-10% - Continue to monitor and complete 2-hour study. Seizure [place] 11-89% - Epileptiform activity present. Notify provider for next steps. Seizure [place] >/= 90% - Epileptiform activity consistent with Status Epilepticus. Immediately notify provider. *Patients with Seizure [place] above 10% that persists may require a study longer than 2 hours. Maximum recording duration is 24 hours. Please update provider with a persistent increase in Seizure [place] above 10%.

## 2023-03-02 NOTE — PROGRESS NOTES
[facility] Progress Note    Patient: Francia Mcgarry MRN: [id number]  SSN: [id number]    YOB: 1990  Age: 29 y.o. Sex: male      Admit Date: 3/1/2023    LOS: 1 day     Subjective:   29 M admitted 03/01 via ED after apparent drug overdose complicated by prolonged cardiorespiratory arrest (unknown down time prior to being found, at least 40 mins of ACLS after EMS arrival). 03/01 CT head: Abnormal appearance of the parenchyma with loss of gray-white differentiation. The basilar cisterns are effaced suggesting edema. In the proper clinical setting, this is concerning for a diffuse, anoxic injury  03/01 Echocardiogram: LVEF 40-45%. Moderate global hypokinesis  03/01 EEG: widespread generalized slowing and very low amplitude of the background rhythms most consistent with widespread and diffuse encephalopathy of toxic metabolic or degenerative type possibly consistent with an anoxic ischemic encephalopathy. 03/02 exam consistent with brain death. Apnea test planned. [facility] notified. Intubated. No spontaneous ventilatory effort. SpO2 100% on 50% FiO2. CXR with bilateral infiltrates. Remains on vasopressin and norepinephrine. Oligo-anuric. Full comatose. Cranial nerve reflexes absent    Objective:     Vitals:    03/02/23 0900 03/02/23 1000 03/02/23 1100 03/02/23 1117   BP:       Pulse: (!) 107 (!) 104 (!) 106 (!) 105   Resp: 24 24 24 24   Temp: 98.4 °F (36.9 °C) 98.2 °F (36.8 °C) 98.1 °F (36.7 °C)    SpO2: 95% 98% 99% 99%   Weight:       Height:            Intake and Output:  Current Shift: No intake/output data recorded. Last three shifts: 02/28 1901 - 03/02 0700  In: 3849.5 [I.V.:3849.5]  Out: 1995 [Urine:155; Drains:1300]    Physical Exam:   Comatose on no sedation  HEENT: NCAT, pupils fixed and dilated  No JVD noted  Few rhonchi, no wheezes  Sinus, reg, no M  Abd soft, hypoactive  Ext warm, no edema  Pupils dilated and nonreactive, oculocephalic and corneal reflexes absent.  No withdrawal from painful stimuli    Lab/Data Review:  Lab results reviewed. For significant abnormal values and values requiring intervention, see assessment and plan. CXR: ill defined B infiltrates (personally reviewed)    Assessment:   S/P prolonged OHCA - due to opiate OD  Ventilator dependent resp failure post arrest  Probable aspiration PNA vs noncardiogenic edema  Mild cardiomyopathy - likely post-arrest phenomenon  Circulatory shock - actively titrating pressors  JOSE MARTIN, oligo-anuric. Worsening renal indices  Mild metabolic acidosis  Markedly elevated PCT - suspected aspiration PNA  Severe hypoxic-ischemic encephalopathy.  Suspect brain death    Plan:   Vent settings reviewed and adjusted with RT  Continue vasopressors with MAP goal of 65 mmHg  Monitor chemistry panels  Not presently deemed a candidate for HD  Empiric vanc/Zosyn initiated  Respiratory culture sent  Follow CBC  [facility] notified and have initiated evaluation  Family updated regarding grave prognosis  Apnea Test planned for this afternoon    CCM time 60 mins including evaluation of database, examination of patient, discussion on MDRs and with respiratory therapy, update of family and discussion with [employer]    Signed By: Aria Hicks MD     March 2, 2023

## 2023-03-02 NOTE — H&P
[facility] INITIAL ASSESSMENT. Name: Shorty Evans   : 1990   MRN: [id number]   Date: 3/2/2023        No chief complaint on file. HPI:   Readmitted as organ donor    Assessment/Plan:   Cardiac arrest  Presumed opioid overdose - Relapse per girlfriend  - Stat head CT / EEG, presumed anoxic brain injury  - Avoid hyperthermia, Cooling for temp >36.5    Hypoxic / hypercapnic respiratory failure  Aspiration   - send sputum cx / procal   - broad spectrum abx    Shock   Cardiogenic / volume depletion / sepsis    - Continue pressor support  - Echo ordered     Next of kin: mother Maxine Lima          Review of systems:     ROS   Unable to obtain     Objective:     Vital Signs: There were no vitals taken for this visit. Temp (24hrs), Av.1 °F (36.7 °C), Min:96.4 °F (35.8 °C), Max:99 °F (37.2 °C)           Intake/Output:   No intake or output data in the 24 hours ending 23 1813      Physical Exam  Constitutional:       Appearance: He is ill-appearing. Comments: Vomit in nares and mouth   Eyes:      Comments: Fixed and dilated    Neck:      Comments: Cervical collar in place  Cardiovascular:      Rate and Rhythm: Regular rhythm. Tachycardia present. Pulses: Normal pulses. Pulmonary:      Breath sounds: Rhonchi present. Comments: Mechanical breath sounds   Abdominal:      General: Abdomen is flat. Bowel sounds are normal.      Palpations: Abdomen is soft. Musculoskeletal:      Right lower leg: No edema. Left lower leg: No edema. Skin:     General: Skin is warm. Neurological:      Comments: Non responsive  (-)gag  (-) cough        Past Medical History:        has a past medical history of Anxiety, Bipolar affective ([facility] [address] and Heroin addiction ([facility] [address] Past Surgical History:      has a past surgical history that includes hx other surgical.      Home Medications:     Prior to Admission medications    Medication Sig Start Date End Date Taking? Authorizing Provider   ketorolac (TORADOL) 10 mg tablet Take 1 Tab by mouth every six (6) hours as needed for Pain for up to 10 doses. 4/25/21   TERESA Gilliam   diazePAM (Valium) 5 mg tablet Take 1 Tab by mouth every eight (8) hours as needed (spasm/pain) for up to 8 doses. Max Daily Amount: 15 mg. 4/25/21   TERESA Gilliam   naloxone [facility]) 4 mg/actuation nasal spray Use 1 spray intranasally, then discard. Repeat with new spray every 2 min as needed for opioid overdose symptoms, alternating nostrils. 4/25/21   TERESA Gilliam   methadone HCl (METHADONE PO) Take 240 mg by mouth. Provider, Ally   QUEtiapine (SEROQUEL) 100 mg tablet Take 1 Tab by mouth two (2) times a day. Patient taking differently: Take 100 mg by mouth two (2) times a day. Indications: Pt state he takes 1 tab at night. 2/6/12   Tavo Fernandez MD   QUEtiapine (SEROQUEL) 300 mg tablet Take 1 Tab by mouth nightly. 2/6/12   Tavo Fernandez MD         Allergies/Social/Family History:     No Known Allergies   Social History     Tobacco Use    Smoking status: Former    Smokeless tobacco: Never   Substance Use Topics    Alcohol use: Not Currently      Family History   Problem Relation Age of Onset    No Known Problems Mother     No Known Problems Father          LABS AND  DATA:   Reviewed      Peak airway pressure:      Minute ventilation:        CRITICAL CARE CONSULTANT NOTE  I had a face to face encounter with the patient, reviewed and interpreted patient data including clinical events, labs, images, vital signs, I/O's, and examined patient. I have discussed the case and the plan and management of the patient's care with the consulting services, the bedside nurses and the respiratory therapist.      NOTE OF PERSONAL INVOLVEMENT IN CARE   This patient has a high probability of imminent, clinically significant deterioration, which requires the highest level of preparedness to intervene urgently.  I participated in the decision-making and personally managed or directed the management of the following life and organ supporting interventions that required my frequent assessment to treat or prevent imminent deterioration.       Roselia Martin MD   Pulmonary/[facility]  [phone]  3/2/2023

## 2023-03-02 NOTE — PROCEDURES
The bronchoscope was introduced via the ETT and a complete airway examination was performed. This revealed normal anatomic arrangement of airways. The airways were diffusely erythematous and there were scant to mild mucoid secretions throughout, mostly in the dependent lung zones. There were no foreign bodies tumors or masses noted.  Washings were obtained from the RML and JAYLEN and sent for cultures and COVID PCR per request    Edouard Sharif, [facility] Floor  [phone]  3/2/2023 6:44 PM

## 2023-03-02 NOTE — DEATH NOTE
Brain Death Pronouncement Note    Patient's Name: Rhea Carlson   Patient's YOB: 1990  MRN Number: [id number]    Admitting Provider: Hernan Fan MD  Attending Provider: Tri Oshea MD     Patient meets criteria for brain death testing. He has not received any sedation for nearly 24 hours. He is full unresponsive with absence of spontaneous movement and absence of withdrawal from noxious stimuli. There is no posturing. There is no spontaneous respiratory effort. His pupils are maximally dilated and unreactive to light. Corneal, gag, oculovestibular and oculocephalic reflexes are absent. There is no cough reflex. An apnea test was performed and during > 10 mins, there was no spontaneous respiratory effort. PaCO2 jose from 34 to 72 torr and pH dropped from 7.40 to 7. 11.     All criteria for brain death were met at 1424 on this day 3/2/2023    Patient has been referred for possible organ donation      Jese Baltazar, [facility],[address]  [phone]  3/2/2023 2:48 PM

## 2023-03-02 NOTE — PROGRESS NOTES
Pharmacy Antimicrobial Kinetic Dosing    Indication for Antimicrobials: aspiration pneumonia     Current Regimen of Each Antimicrobial:  Vancomycin - pharmacy dosing (Start Date 3/1; Day # 2)  Zosyn 4.5 g IV once then 3.375 g IV q8H (Start Date 3/1; Day # 2)    Goal Level: AUC: 400-600 mg/hr/Liter/day    Date Dose & Interval Measured (mcg/mL) Predicted AUC/JACKIE                       Date & time of next level: 3/3    Dosing calculator used: Solmentum calculator    Significant Positive Cultures:   3/2 sputum cx: pending  3/1 blood cx: ngtd, pending    Conditions for Dosing Consideration: None    Labs:  Recent Labs     23  0249 23  1348   CREA 2.67* 1.65*   BUN 31* 18   .49  --      Recent Labs     23  0249 23  1348   WBC 7.5 8.4   BANDS  --  4     Temp (24hrs), Av.1 °F (36.7 °C), Min:94.1 °F (34.5 °C), Max:99 °F (37.2 °C)    Creatinine Clearance (mL/min):   CrCl (Adjusted Body Weight): 45.8 mL/min   If actual weight < IBW: CrCl (Actual Body Weight) 48.0    Impression/Plan:   Scr has increased significantly, so the vancomycin regimen was adjusted to 1000 mg IV q24hr. Will check a level tomorrow morning prior to the patient receiving the next dose of vancomycin. Zosyn saul  Antimicrobial stop date TBD     Pharmacy will follow daily and adjust medications as appropriate for renal function and/or serum levels.     Thank you,  Dany Ross, PHARMD

## 2023-03-02 NOTE — PROGRESS NOTES
Headband: applied  Date/Time: 3/2/23 @ [phone]  Recorder: recording started  Skin: Intact  Highest Seizure [place] Percentage past hour: N/A      General info regarding Seizure [place] %:  Minimum duration of study is 2 hours. If Seizure [place] has remained 0% throughout the entire 2-hour duration, communicate with provider to stop the recording. Seizure [place] 0-10% - Continue to monitor and complete 2-hour study. Seizure [place] 11-89% - Epileptiform activity present. Notify provider for next steps. Seizure [place] >/= 90% - Epileptiform activity consistent with Status Epilepticus. Immediately notify provider. *Patients with Seizure [place] above 10% that persists may require a study longer than 2 hours. Maximum recording duration is 24 hours. Please update provider with a persistent increase in Seizure [place] above 10%.

## 2023-03-02 NOTE — PROCEDURES
Procedure Note - Arterial Access:   Performed by Aaliyah Rao NP. Diagnosis: cardiac arrest  Insertion Date: 03/01/23   Time: 10:00 PM   Obtained Consent? yes; emergent   Procedure Location:  ICU. Immediately prior to the procedure, the patient was reevaluated and found suitable for the planned procedure and any planned medications. Immediately prior to the procedure a time out was called to verify the correct patient, procedure, equipment, staff, and marking as appropriate. Collateral circulation confirmed with Florie Veronica test.     Line Bundle:  Full sterile barrier precautions used. 5 mL 1% Lidocaine placed at insertion site. Method: Seldinger technique. Site Prep: ChloraPrep. Procedure: Arterial Catheter Insertion in Right, Radial Artery   Catheter inserted into a new site. Number of Attempts:  1  Indication: Monitoring and Blood Drawing. There was bright red, pulsatile blood return. Femoral Site? no. If Yes, reason femoral site was chosen: n/a  Catheter secured. Biopatch/CHG sterile bio-occlusive dressing  in place? yes. Complication None. The procedure was tolerated well.     Aaliyah Rao NP   Critical Care Medicine  [facility]

## 2023-03-02 NOTE — ED NOTES
TRANSFER - OUT REPORT:    Verbal report given to Chevy Andrew 46 on Lis Purdy  being transferred to CCU for routine progression of care       Report consisted of patients Situation, Background, Assessment and   Recommendations(SBAR). Information from the following report(s) SBAR, ED Summary, [facility] and Cardiac Rhythm Sinus Tachycardia was reviewed with the receiving nurse. Lines:   Triple Lumen 03/01/23 Left;Proximal Femoral (Active)       Peripheral IV 03/01/23 Left Antecubital (Active)   Site Assessment Clean, dry, & intact 03/01/23 1342   Phlebitis Assessment 0 03/01/23 1342   Infiltration Assessment 0 03/01/23 1342   Dressing Status Clean, dry, & intact 03/01/23 1342       Peripheral IV 03/01/23 Right Antecubital (Active)   Site Assessment Clean, dry, & intact 03/01/23 1343   Phlebitis Assessment 0 03/01/23 1343   Infiltration Assessment 0 03/01/23 1343   Dressing Status Clean, dry, & intact 03/01/23 1343        Opportunity for questions and clarification was provided.       Patient transported with:   Monitor  O2 @ vent liters  Registered Nurse  Tech

## 2023-03-03 ENCOUNTER — APPOINTMENT (OUTPATIENT)
Dept: CT IMAGING | Age: 33
End: 2023-03-03
Attending: INTERNAL MEDICINE
Payer: COMMERCIAL

## 2023-03-03 ENCOUNTER — APPOINTMENT (OUTPATIENT)
Dept: GENERAL RADIOLOGY | Age: 33
End: 2023-03-03
Payer: COMMERCIAL

## 2023-03-03 ENCOUNTER — APPOINTMENT (OUTPATIENT)
Dept: NON INVASIVE DIAGNOSTICS | Age: 33
End: 2023-03-03
Attending: INTERNAL MEDICINE
Payer: COMMERCIAL

## 2023-03-03 LAB
ALBUMIN SERPL-MCNC: 2.4 G/DL (ref 3.5–5)
ALBUMIN SERPL-MCNC: 2.5 G/DL (ref 3.5–5)
ALBUMIN SERPL-MCNC: 2.6 G/DL (ref 3.5–5)
ALBUMIN/GLOB SERPL: 1.1 (ref 1.1–2.2)
ALBUMIN/GLOB SERPL: 1.1 (ref 1.1–2.2)
ALBUMIN/GLOB SERPL: 1.2 (ref 1.1–2.2)
ALP SERPL-CCNC: 45 U/L (ref 45–117)
ALP SERPL-CCNC: 47 U/L (ref 45–117)
ALP SERPL-CCNC: 49 U/L (ref 45–117)
ALT SERPL-CCNC: 316 U/L (ref 12–78)
ALT SERPL-CCNC: 338 U/L (ref 12–78)
ALT SERPL-CCNC: 376 U/L (ref 12–78)
AMORPH CRY URNS QL MICRO: ABNORMAL
ANION GAP SERPL CALC-SCNC: 13 MMOL/L (ref 5–15)
ANION GAP SERPL CALC-SCNC: 17 MMOL/L (ref 5–15)
ANION GAP SERPL CALC-SCNC: 9 MMOL/L (ref 5–15)
APPEARANCE UR: CLEAR
APTT PPP: 36 SEC (ref 22.1–31)
APTT PPP: 38.2 SEC (ref 22.1–31)
APTT PPP: 39.9 SEC (ref 22.1–31)
ARTERIAL PATENCY WRIST A: ABNORMAL
AST SERPL-CCNC: 186 U/L (ref 15–37)
AST SERPL-CCNC: 186 U/L (ref 15–37)
AST SERPL-CCNC: 216 U/L (ref 15–37)
ATRIAL RATE: 130 BPM
ATRIAL RATE: 130 BPM
BACTERIA URNS QL MICRO: ABNORMAL /HPF
BASE DEFICIT BLD-SCNC: 7.9 MMOL/L
BASE DEFICIT BLDA-SCNC: 1.9 MMOL/L
BASE DEFICIT BLDA-SCNC: 6.3 MMOL/L
BASE DEFICIT BLDA-SCNC: 6.7 MMOL/L
BASE DEFICIT BLDA-SCNC: 8.3 MMOL/L
BASE EXCESS BLDA CALC-SCNC: 1.2 MMOL/L
BASOPHILS # BLD: 0 K/UL (ref 0–0.1)
BASOPHILS NFR BLD: 0 % (ref 0–1)
BDY SITE: ABNORMAL
BILIRUB DIRECT SERPL-MCNC: 0.5 MG/DL (ref 0–0.2)
BILIRUB SERPL-MCNC: 1.2 MG/DL (ref 0.2–1)
BILIRUB SERPL-MCNC: 1.4 MG/DL (ref 0.2–1)
BILIRUB SERPL-MCNC: 1.6 MG/DL (ref 0.2–1)
BILIRUB UR QL: NEGATIVE
BUN SERPL-MCNC: 28 MG/DL (ref 6–20)
BUN SERPL-MCNC: 43 MG/DL (ref 6–20)
BUN SERPL-MCNC: 50 MG/DL (ref 6–20)
BUN/CREAT SERPL: 7 (ref 12–20)
BUN/CREAT SERPL: 8 (ref 12–20)
BUN/CREAT SERPL: 8 (ref 12–20)
CA-I BLD-SCNC: 1.14 MMOL/L (ref 1.12–1.32)
CALCIUM SERPL-MCNC: 8 MG/DL (ref 8.5–10.1)
CALCIUM SERPL-MCNC: 8.2 MG/DL (ref 8.5–10.1)
CALCIUM SERPL-MCNC: 8.2 MG/DL (ref 8.5–10.1)
CALCULATED P AXIS, ECG09: 77 DEGREES
CALCULATED P AXIS, ECG09: 81 DEGREES
CALCULATED R AXIS, ECG10: 77 DEGREES
CALCULATED R AXIS, ECG10: 77 DEGREES
CALCULATED T AXIS, ECG11: 50 DEGREES
CALCULATED T AXIS, ECG11: 51 DEGREES
CHLORIDE SERPL-SCNC: 100 MMOL/L (ref 97–108)
CHLORIDE SERPL-SCNC: 101 MMOL/L (ref 97–108)
CHLORIDE SERPL-SCNC: 104 MMOL/L (ref 97–108)
CK SERPL-CCNC: 226 U/L (ref 39–308)
CK SERPL-CCNC: 322 U/L (ref 39–308)
CK SERPL-CCNC: 531 U/L (ref 39–308)
CO2 SERPL-SCNC: 19 MMOL/L (ref 21–32)
CO2 SERPL-SCNC: 22 MMOL/L (ref 21–32)
CO2 SERPL-SCNC: 27 MMOL/L (ref 21–32)
COLOR UR: ABNORMAL
COMMENT, HOLDF: NORMAL
CREAT SERPL-MCNC: 3.95 MG/DL (ref 0.7–1.3)
CREAT SERPL-MCNC: 5.27 MG/DL (ref 0.7–1.3)
CREAT SERPL-MCNC: 6.17 MG/DL (ref 0.7–1.3)
DIAGNOSIS, 93000: NORMAL
DIAGNOSIS, 93000: NORMAL
DIFFERENTIAL METHOD BLD: ABNORMAL
ECHO AV MEAN GRADIENT: 5 MMHG
ECHO AV MEAN VELOCITY: 1.1 M/S
ECHO AV PEAK GRADIENT: 8 MMHG
ECHO AV PEAK VELOCITY: 1.4 M/S
ECHO AV VELOCITY RATIO: 0.86
ECHO AV VTI: 20.4 CM
ECHO LA VOL 4C: 24 ML (ref 18–58)
ECHO LA VOLUME INDEX A4C: 12 ML/M2 (ref 16–34)
ECHO LV E' LATERAL VELOCITY: 17 CM/S
ECHO LV E' SEPTAL VELOCITY: 14 CM/S
ECHO LV EDV A4C: 102 ML
ECHO LV EDV INDEX A4C: 50 ML/M2
ECHO LV EJECTION FRACTION A4C: 69 %
ECHO LV ESV A4C: 32 ML
ECHO LV ESV INDEX A4C: 16 ML/M2
ECHO LVOT AV VTI INDEX: 1
ECHO LVOT MEAN GRADIENT: 3 MMHG
ECHO LVOT PEAK GRADIENT: 5 MMHG
ECHO LVOT PEAK VELOCITY: 1.2 M/S
ECHO LVOT VTI: 20.4 CM
ECHO MV A VELOCITY: 0.88 M/S
ECHO MV E DECELERATION TIME (DT): 178.4 MS
ECHO MV E VELOCITY: 0.97 M/S
ECHO MV E/A RATIO: 1.1
ECHO MV E/E' LATERAL: 5.71
ECHO MV E/E' RATIO (AVERAGED): 6.32
ECHO MV E/E' SEPTAL: 6.93
ECHO MV LVOT VTI INDEX: 1.07
ECHO MV MAX VELOCITY: 1.2 M/S
ECHO MV MEAN GRADIENT: 3 MMHG
ECHO MV MEAN VELOCITY: 0.9 M/S
ECHO MV PEAK GRADIENT: 6 MMHG
ECHO MV VTI: 21.8 CM
ECHO PV MAX VELOCITY: 1.4 M/S
ECHO PV PEAK GRADIENT: 7 MMHG
ECHO RV TAPSE: 2.3 CM (ref 1.7–?)
EOSINOPHIL # BLD: 0 K/UL (ref 0–0.4)
EOSINOPHIL NFR BLD: 0 % (ref 0–7)
EPITH CASTS URNS QL MICRO: ABNORMAL /LPF
ERYTHROCYTE [DISTWIDTH] IN BLOOD BY AUTOMATED COUNT: 12.7 % (ref 11.5–14.5)
ERYTHROCYTE [DISTWIDTH] IN BLOOD BY AUTOMATED COUNT: 12.7 % (ref 11.5–14.5)
ERYTHROCYTE [DISTWIDTH] IN BLOOD BY AUTOMATED COUNT: 12.8 % (ref 11.5–14.5)
FIO2 ON VENT: 100 %
FIO2 ON VENT: 50 %
GAS FLOW.O2 O2 DELIVERY SYS: ABNORMAL
GAS FLOW.O2 SETTING OXYMISER: 20
GAS FLOW.O2 SETTING OXYMISER: 20 BPM
GLOBULIN SER CALC-MCNC: 2 G/DL (ref 2–4)
GLOBULIN SER CALC-MCNC: 2.2 G/DL (ref 2–4)
GLOBULIN SER CALC-MCNC: 2.4 G/DL (ref 2–4)
GLUCOSE BLD STRIP.AUTO-MCNC: 204 MG/DL (ref 65–117)
GLUCOSE BLD STRIP.AUTO-MCNC: 209 MG/DL (ref 65–117)
GLUCOSE BLD STRIP.AUTO-MCNC: 223 MG/DL (ref 65–117)
GLUCOSE BLD STRIP.AUTO-MCNC: 225 MG/DL (ref 65–117)
GLUCOSE BLD STRIP.AUTO-MCNC: 226 MG/DL (ref 65–117)
GLUCOSE BLD STRIP.AUTO-MCNC: 231 MG/DL (ref 65–117)
GLUCOSE BLD STRIP.AUTO-MCNC: 235 MG/DL (ref 65–117)
GLUCOSE BLD STRIP.AUTO-MCNC: 237 MG/DL (ref 65–117)
GLUCOSE BLD STRIP.AUTO-MCNC: 237 MG/DL (ref 65–117)
GLUCOSE BLD STRIP.AUTO-MCNC: 241 MG/DL (ref 65–117)
GLUCOSE BLD STRIP.AUTO-MCNC: 285 MG/DL (ref 65–117)
GLUCOSE BLD STRIP.AUTO-MCNC: 399 MG/DL (ref 65–117)
GLUCOSE SERPL-MCNC: 179 MG/DL (ref 65–100)
GLUCOSE SERPL-MCNC: 251 MG/DL (ref 65–100)
GLUCOSE SERPL-MCNC: 386 MG/DL (ref 65–100)
GLUCOSE UR STRIP.AUTO-MCNC: 100 MG/DL
HCO3 BLD-SCNC: 18 MMOL/L (ref 22–26)
HCO3 BLDA-SCNC: 18 MMOL/L (ref 22–26)
HCO3 BLDA-SCNC: 19 MMOL/L (ref 22–26)
HCO3 BLDA-SCNC: 19 MMOL/L (ref 22–26)
HCO3 BLDA-SCNC: 22 MMOL/L (ref 22–26)
HCO3 BLDA-SCNC: 25 MMOL/L (ref 22–26)
HCT VFR BLD AUTO: 28.8 % (ref 36.6–50.3)
HCT VFR BLD AUTO: 29.2 % (ref 36.6–50.3)
HCT VFR BLD AUTO: 31 % (ref 36.6–50.3)
HGB BLD-MCNC: 10.2 G/DL (ref 12.1–17)
HGB BLD-MCNC: 10.2 G/DL (ref 12.1–17)
HGB BLD-MCNC: 11 G/DL (ref 12.1–17)
HGB UR QL STRIP: ABNORMAL
HYALINE CASTS URNS QL MICRO: ABNORMAL /LPF (ref 0–5)
IMM GRANULOCYTES # BLD AUTO: 0 K/UL (ref 0–0.04)
IMM GRANULOCYTES NFR BLD AUTO: 0 % (ref 0–0.5)
INR PPP: 1.4 (ref 0.9–1.1)
INR PPP: 1.5 (ref 0.9–1.1)
INR PPP: 1.6 (ref 0.9–1.1)
KETONES UR QL STRIP.AUTO: NEGATIVE MG/DL
LEUKOCYTE ESTERASE UR QL STRIP.AUTO: ABNORMAL
LYMPHOCYTES # BLD: 0.4 K/UL (ref 0.8–3.5)
LYMPHOCYTES # BLD: 0.5 K/UL (ref 0.8–3.5)
LYMPHOCYTES # BLD: 0.6 K/UL (ref 0.8–3.5)
LYMPHOCYTES NFR BLD: 3 % (ref 12–49)
LYMPHOCYTES NFR BLD: 3 % (ref 12–49)
LYMPHOCYTES NFR BLD: 4 % (ref 12–49)
MAGNESIUM SERPL-MCNC: 1.8 MG/DL (ref 1.6–2.4)
MAGNESIUM SERPL-MCNC: 2.1 MG/DL (ref 1.6–2.4)
MAGNESIUM SERPL-MCNC: 2.2 MG/DL (ref 1.6–2.4)
MCH RBC QN AUTO: 30.8 PG (ref 26–34)
MCH RBC QN AUTO: 30.9 PG (ref 26–34)
MCH RBC QN AUTO: 31.2 PG (ref 26–34)
MCHC RBC AUTO-ENTMCNC: 34.9 G/DL (ref 30–36.5)
MCHC RBC AUTO-ENTMCNC: 35.4 G/DL (ref 30–36.5)
MCHC RBC AUTO-ENTMCNC: 35.5 G/DL (ref 30–36.5)
MCV RBC AUTO: 87 FL (ref 80–99)
MCV RBC AUTO: 87.8 FL (ref 80–99)
MCV RBC AUTO: 88.5 FL (ref 80–99)
METAMYELOCYTES NFR BLD MANUAL: 5 %
MONOCYTES # BLD: 0 K/UL (ref 0–1)
MONOCYTES # BLD: 0.3 K/UL (ref 0–1)
MONOCYTES # BLD: 0.6 K/UL (ref 0–1)
MONOCYTES NFR BLD: 0 % (ref 5–13)
MONOCYTES NFR BLD: 2 % (ref 5–13)
MONOCYTES NFR BLD: 4 % (ref 5–13)
MYELOCYTES NFR BLD MANUAL: 1 %
NEUTS BAND NFR BLD MANUAL: 11 %
NEUTS BAND NFR BLD MANUAL: 21 %
NEUTS BAND NFR BLD MANUAL: 9 %
NEUTS SEG # BLD: 13 K/UL (ref 1.8–8)
NEUTS SEG # BLD: 13.4 K/UL (ref 1.8–8)
NEUTS SEG # BLD: 14.7 K/UL (ref 1.8–8)
NEUTS SEG NFR BLD: 75 % (ref 32–75)
NEUTS SEG NFR BLD: 80 % (ref 32–75)
NEUTS SEG NFR BLD: 82 % (ref 32–75)
NITRITE UR QL STRIP.AUTO: NEGATIVE
NRBC # BLD: 0 K/UL (ref 0–0.01)
NRBC BLD-RTO: 0 PER 100 WBC
O2/TOTAL GAS SETTING VFR VENT: 100 %
P-R INTERVAL, ECG05: 120 MS
P-R INTERVAL, ECG05: 128 MS
PCO2 BLD: 37.6 MMHG (ref 35–45)
PCO2 BLDA: 34 MMHG (ref 35–45)
PCO2 BLDA: 37 MMHG (ref 35–45)
PCO2 BLDA: 37 MMHG (ref 35–45)
PCO2 BLDA: 38 MMHG (ref 35–45)
PCO2 BLDA: 38 MMHG (ref 35–45)
PEEP RESPIRATORY: 5
PEEP RESPIRATORY: 5 CMH2O
PH BLD: 7.29 (ref 7.35–7.45)
PH BLDA: 7.28 (ref 7.35–7.45)
PH BLDA: 7.32 (ref 7.35–7.45)
PH BLDA: 7.33 (ref 7.35–7.45)
PH BLDA: 7.42 (ref 7.35–7.45)
PH BLDA: 7.45 (ref 7.35–7.45)
PH UR STRIP: 5 (ref 5–8)
PHOSPHATE SERPL-MCNC: 2 MG/DL (ref 2.6–4.7)
PHOSPHATE SERPL-MCNC: 3 MG/DL (ref 2.6–4.7)
PHOSPHATE SERPL-MCNC: 3 MG/DL (ref 2.6–4.7)
PLATELET # BLD AUTO: 130 K/UL (ref 150–400)
PLATELET # BLD AUTO: 130 K/UL (ref 150–400)
PLATELET # BLD AUTO: 151 K/UL (ref 150–400)
PLATELET COMMENTS,PCOM: ABNORMAL
PMV BLD AUTO: 9.2 FL (ref 8.9–12.9)
PMV BLD AUTO: 9.4 FL (ref 8.9–12.9)
PMV BLD AUTO: 9.4 FL (ref 8.9–12.9)
PO2 BLD: 457 MMHG (ref 80–100)
PO2 BLDA: 141 MMHG (ref 80–100)
PO2 BLDA: 425 MMHG (ref 80–100)
PO2 BLDA: 439 MMHG (ref 80–100)
PO2 BLDA: 452 MMHG (ref 80–100)
PO2 BLDA: 487 MMHG (ref 80–100)
POTASSIUM SERPL-SCNC: 3 MMOL/L (ref 3.5–5.1)
POTASSIUM SERPL-SCNC: 3.4 MMOL/L (ref 3.5–5.1)
POTASSIUM SERPL-SCNC: 3.6 MMOL/L (ref 3.5–5.1)
PROT SERPL-MCNC: 4.4 G/DL (ref 6.4–8.2)
PROT SERPL-MCNC: 4.7 G/DL (ref 6.4–8.2)
PROT SERPL-MCNC: 5 G/DL (ref 6.4–8.2)
PROT UR STRIP-MCNC: 30 MG/DL
PROTHROMBIN TIME: 14.2 SEC (ref 9–11.1)
PROTHROMBIN TIME: 15.7 SEC (ref 9–11.1)
PROTHROMBIN TIME: 16.4 SEC (ref 9–11.1)
Q-T INTERVAL, ECG07: 316 MS
Q-T INTERVAL, ECG07: 320 MS
QRS DURATION, ECG06: 76 MS
QRS DURATION, ECG06: 76 MS
QTC CALCULATION (BEZET), ECG08: 465 MS
QTC CALCULATION (BEZET), ECG08: 470 MS
RBC # BLD AUTO: 3.3 M/UL (ref 4.1–5.7)
RBC # BLD AUTO: 3.31 M/UL (ref 4.1–5.7)
RBC # BLD AUTO: 3.53 M/UL (ref 4.1–5.7)
RBC #/AREA URNS HPF: ABNORMAL /HPF (ref 0–5)
RBC MORPH BLD: ABNORMAL
SAMPLES BEING HELD,HOLD: NORMAL
SAO2 % BLD: 100 % (ref 92–97)
SAO2 % BLD: 99 % (ref 92–97)
SAO2% DEVICE SAO2% SENSOR NAME: ABNORMAL
SERVICE CMNT-IMP: ABNORMAL
SODIUM SERPL-SCNC: 136 MMOL/L (ref 136–145)
SODIUM SERPL-SCNC: 137 MMOL/L (ref 136–145)
SODIUM SERPL-SCNC: 139 MMOL/L (ref 136–145)
SP GR UR REFRACTOMETRY: 1.01
SPECIMEN SITE: ABNORMAL
SPECIMEN TYPE: ABNORMAL
THERAPEUTIC RANGE,PTTT: ABNORMAL SECS (ref 58–77)
TROPONIN I SERPL HS-MCNC: 3209 NG/L (ref 0–76)
TROPONIN I SERPL HS-MCNC: 3916 NG/L (ref 0–76)
TROPONIN I SERPL HS-MCNC: 6783 NG/L (ref 0–76)
UA: UC IF INDICATED,UAUC: ABNORMAL
UROBILINOGEN UR QL STRIP.AUTO: 0.2 EU/DL (ref 0.2–1)
VENTILATION MODE VENT: ABNORMAL
VENTRICULAR RATE, ECG03: 130 BPM
VENTRICULAR RATE, ECG03: 130 BPM
VT SETTING VENT: 570
VT SETTING VENT: 570 ML
WBC # BLD AUTO: 14 K/UL (ref 4.1–11.1)
WBC # BLD AUTO: 14 K/UL (ref 4.1–11.1)
WBC # BLD AUTO: 16.5 K/UL (ref 4.1–11.1)
WBC MORPH BLD: ABNORMAL
WBC URNS QL MICRO: ABNORMAL /HPF (ref 0–4)

## 2023-03-03 PROCEDURE — 84100 ASSAY OF PHOSPHORUS: CPT

## 2023-03-03 PROCEDURE — 74011636637 HC RX REV CODE- 636/637

## 2023-03-03 PROCEDURE — 74011000250 HC RX REV CODE- 250: Performed by: INTERNAL MEDICINE

## 2023-03-03 PROCEDURE — 82550 ASSAY OF CK (CPK): CPT

## 2023-03-03 PROCEDURE — 85610 PROTHROMBIN TIME: CPT

## 2023-03-03 PROCEDURE — 82803 BLOOD GASES ANY COMBINATION: CPT

## 2023-03-03 PROCEDURE — 94002 VENT MGMT INPAT INIT DAY: CPT

## 2023-03-03 PROCEDURE — 76010000379 HC BRONCHOSCOPY DIAG/THERAPEUTIC

## 2023-03-03 PROCEDURE — 93005 ELECTROCARDIOGRAM TRACING: CPT

## 2023-03-03 PROCEDURE — 80053 COMPREHEN METABOLIC PANEL: CPT

## 2023-03-03 PROCEDURE — 36600 WITHDRAWAL OF ARTERIAL BLOOD: CPT

## 2023-03-03 PROCEDURE — 36415 COLL VENOUS BLD VENIPUNCTURE: CPT

## 2023-03-03 PROCEDURE — 87086 URINE CULTURE/COLONY COUNT: CPT

## 2023-03-03 PROCEDURE — 81001 URINALYSIS AUTO W/SCOPE: CPT

## 2023-03-03 PROCEDURE — 82248 BILIRUBIN DIRECT: CPT

## 2023-03-03 PROCEDURE — 71045 X-RAY EXAM CHEST 1 VIEW: CPT

## 2023-03-03 PROCEDURE — 74011250636 HC RX REV CODE- 250/636

## 2023-03-03 PROCEDURE — 74176 CT ABD & PELVIS W/O CONTRAST: CPT

## 2023-03-03 PROCEDURE — 90935 HEMODIALYSIS ONE EVALUATION: CPT

## 2023-03-03 PROCEDURE — 71250 CT THORAX DX C-: CPT

## 2023-03-03 PROCEDURE — 85025 COMPLETE CBC W/AUTO DIFF WBC: CPT

## 2023-03-03 PROCEDURE — 80076 HEPATIC FUNCTION PANEL: CPT

## 2023-03-03 PROCEDURE — 83735 ASSAY OF MAGNESIUM: CPT

## 2023-03-03 PROCEDURE — 94668 MNPJ CHEST WALL SBSQ: CPT

## 2023-03-03 PROCEDURE — 85730 THROMBOPLASTIN TIME PARTIAL: CPT

## 2023-03-03 PROCEDURE — 82330 ASSAY OF CALCIUM: CPT

## 2023-03-03 PROCEDURE — 74011000258 HC RX REV CODE- 258: Performed by: INTERNAL MEDICINE

## 2023-03-03 PROCEDURE — 93306 TTE W/DOPPLER COMPLETE: CPT

## 2023-03-03 PROCEDURE — 94640 AIRWAY INHALATION TREATMENT: CPT

## 2023-03-03 PROCEDURE — 82962 GLUCOSE BLOOD TEST: CPT

## 2023-03-03 PROCEDURE — 74011000250 HC RX REV CODE- 250

## 2023-03-03 PROCEDURE — 80047 BASIC METABLC PNL IONIZED CA: CPT

## 2023-03-03 PROCEDURE — 74011250636 HC RX REV CODE- 250/636: Performed by: INTERNAL MEDICINE

## 2023-03-03 PROCEDURE — 74011000258 HC RX REV CODE- 258

## 2023-03-03 PROCEDURE — 84484 ASSAY OF TROPONIN QUANT: CPT

## 2023-03-03 RX ORDER — SODIUM BICARBONATE 84 MG/ML
100 INJECTION, SOLUTION INTRAVENOUS ONCE
Status: COMPLETED | OUTPATIENT
Start: 2023-03-03 | End: 2023-03-03

## 2023-03-03 RX ORDER — POTASSIUM CHLORIDE 29.8 MG/ML
20 INJECTION INTRAVENOUS ONCE
Status: COMPLETED | OUTPATIENT
Start: 2023-03-03 | End: 2023-03-03

## 2023-03-03 RX ORDER — DEXTROSE MONOHYDRATE 100 MG/ML
0-250 INJECTION, SOLUTION INTRAVENOUS AS NEEDED
Status: DISCONTINUED | OUTPATIENT
Start: 2023-03-03 | End: 2023-03-05 | Stop reason: HOSPADM

## 2023-03-03 RX ORDER — FUROSEMIDE 10 MG/ML
40 INJECTION INTRAMUSCULAR; INTRAVENOUS ONCE
Status: COMPLETED | OUTPATIENT
Start: 2023-03-03 | End: 2023-03-03

## 2023-03-03 RX ADMIN — POTASSIUM CHLORIDE 20 MEQ: 29.8 INJECTION, SOLUTION INTRAVENOUS at 20:49

## 2023-03-03 RX ADMIN — ALBUTEROL SULFATE 2.5 MG: 2.5 SOLUTION RESPIRATORY (INHALATION) at 08:38

## 2023-03-03 RX ADMIN — VANCOMYCIN HYDROCHLORIDE 750 MG: 750 INJECTION, POWDER, LYOPHILIZED, FOR SOLUTION INTRAVENOUS at 19:55

## 2023-03-03 RX ADMIN — ALBUTEROL SULFATE 2.5 MG: 2.5 SOLUTION RESPIRATORY (INHALATION) at 00:18

## 2023-03-03 RX ADMIN — FUROSEMIDE 20 MG: 10 INJECTION, SOLUTION INTRAMUSCULAR; INTRAVENOUS at 00:13

## 2023-03-03 RX ADMIN — WATER: 1000 INJECTION, SOLUTION INTRAVENOUS at 12:23

## 2023-03-03 RX ADMIN — SODIUM CHLORIDE 100 MG/HR: 900 INJECTION INTRAVENOUS at 20:52

## 2023-03-03 RX ADMIN — SODIUM CHLORIDE 6.8 UNITS/HR: 9 INJECTION, SOLUTION INTRAVENOUS at 12:25

## 2023-03-03 RX ADMIN — SODIUM CHLORIDE 75 ML/HR: 4.5 INJECTION, SOLUTION INTRAVENOUS at 03:36

## 2023-03-03 RX ADMIN — POTASSIUM PHOSPHATE, MONOBASIC AND POTASSIUM PHOSPHATE, DIBASIC: 224; 236 INJECTION, SOLUTION, CONCENTRATE INTRAVENOUS at 22:26

## 2023-03-03 RX ADMIN — LEVOTHYROXINE SODIUM ANHYDROUS 20 MCG/HR: 100 INJECTION, POWDER, LYOPHILIZED, FOR SOLUTION INTRAVENOUS at 15:33

## 2023-03-03 RX ADMIN — SODIUM BICARBONATE 100 MEQ: 84 INJECTION, SOLUTION INTRAVENOUS at 03:02

## 2023-03-03 RX ADMIN — PIPERACILLIN AND TAZOBACTAM 4.5 G: 4; .5 INJECTION, POWDER, FOR SOLUTION INTRAVENOUS at 14:07

## 2023-03-03 RX ADMIN — ALBUTEROL SULFATE 2.5 MG: 2.5 SOLUTION RESPIRATORY (INHALATION) at 19:33

## 2023-03-03 RX ADMIN — SODIUM CHLORIDE 100 MG/HR: 900 INJECTION INTRAVENOUS at 10:10

## 2023-03-03 RX ADMIN — CALCIUM GLUCONATE 1000 MG: 20 INJECTION, SOLUTION INTRAVENOUS at 00:43

## 2023-03-03 RX ADMIN — SODIUM BICARBONATE: 84 INJECTION, SOLUTION INTRAVENOUS at 06:44

## 2023-03-03 RX ADMIN — ALBUTEROL SULFATE 2.5 MG: 2.5 SOLUTION RESPIRATORY (INHALATION) at 12:38

## 2023-03-03 RX ADMIN — ALBUTEROL SULFATE 2.5 MG: 2.5 SOLUTION RESPIRATORY (INHALATION) at 16:50

## 2023-03-03 RX ADMIN — PIPERACILLIN AND TAZOBACTAM 4.5 G: 4; .5 INJECTION, POWDER, FOR SOLUTION INTRAVENOUS at 06:44

## 2023-03-03 RX ADMIN — LEVOTHYROXINE SODIUM ANHYDROUS 20 MCG/HR: 100 INJECTION, POWDER, LYOPHILIZED, FOR SOLUTION INTRAVENOUS at 06:45

## 2023-03-03 RX ADMIN — FUROSEMIDE 40 MG: 10 INJECTION, SOLUTION INTRAMUSCULAR; INTRAVENOUS at 00:43

## 2023-03-03 RX ADMIN — ALBUTEROL SULFATE 2.5 MG: 2.5 SOLUTION RESPIRATORY (INHALATION) at 03:38

## 2023-03-03 NOTE — PROGRESS NOTES
End of Shift Note    Bedside shift change report given to Iram Wyatt (oncoming nurse) by Heavenly Jameson RN (offgoing nurse). Report included the following information SBAR, Kardex, Intake/Output, MAR, and Cardiac Rhythm Sinus Tachy    Shift worked:  3377-7834     Shift summary and any significant changes:     Pt admitted yesterday for OD/cardiac arrest. Came from ED to CCU. In ED CT of head showed evidence of anoxic brain injury, GCS 3. OPO notified this morning, family confirmed organ donor status. MD performed confirmation tests and declared patient brain dead this evening. See note from Dr. Iram Wyatt. Pt evaluation for donation suitability in progress.      Concerns for physician to address:  None at this time     Zone phone for oncoming shift:   [phone]       Activity:     Number times ambulated in hallways past shift: 0  Number of times OOB to chair past shift: 0    Cardiac:   Cardiac Monitoring: Yes           Access:  Current line(s): PIV and central line     Genitourinary:   Urinary status: rucker    Respiratory:      Chronic home O2 use?: NO  Incentive spirometer at bedside: NO       GI:     Current diet:  No diet orders on file  Passing flatus: YES  Tolerating current diet: NO       Pain Management:   Patient states pain is manageable on current regimen: N/A    Skin:     Interventions: turn team and float heels    Patient Safety:  Fall Score:    Interventions:        Length of Stay:  Expected LOS: - - -  Actual LOS: 0      Heavenly Jameson, ONOFRE

## 2023-03-03 NOTE — PROCEDURES
[facility]      Procedure Note - Central Venous Access:   Performed by Reji Ann MD    Obtained informed Consent. Immediately prior to the procedure, the patient was reevaluated and found suitable for the planned procedure and any planned medications. Immediately prior to the procedure a time out was called to verify the correct patient, procedure, equipment, staff, and marking as appropriate. The site was prepped with ChloraPrep. Using Seldinger technique a Hemodialysis Catheter was placed in the Left, Femoral Vein via direct cannulation with 1 number of attempts for Dialysis. Ultrasound Guidance was not utilized. There was good blood return. The following complications were encountered: None. A follow-up chest x-ray was not ordered post procedure. The procedure was tolerated well.     Reji Ann, [facility],[address]  [phone]  3/3/2023 11:23 AM

## 2023-03-03 NOTE — DISCHARGE SUMMARY
[facility]    Name: Nasir Plascencia   : 1990   MRN: [id number]   Date: 3/3/2023      DATE OF ADMISSION: 23  DATE OF DISCHARGE/DEATH: 23      ADMISSION DIAGNOSES:  Prolonged out of hospital cardiac arrest  Acute hypoxic/hypercapnic resp failure  Circulatory shock  Polysubstance abuse  Opiate overdose      DISCHARGE DIAGNOSES:   Prolonged out of hospital cardiac arrest  Acute hypoxic/hypercapnic resp failure  Circulatory shock  Polysubstance abuse  Opiate overdose  Aspiration PNA  Noncardiogenic pulmonary edema  JOSE MARTIN  Metabolic acidosis  Elevated procalcitonin  Severe hypoxic - ischemic encephalopathy  Brain death    BRIEF SUMMARY OF ADMISSION:  Pt was admitted with the following HPI and assessment and plan:  HPI:   HPI is limited. No family available bedside. Per ER, the patient was found down by girlfriend. His exact downtime was unknown. She gave him Narcan and called EMS. He was pulseless upon their arrival.  They achieved ROSC X2 in the field and then lost pulse again en route. He was intubated bu EMS. ROSC was achieved a third time in the ER. He was started on moderate dose NE. Drug screen + methadone/opiates /THC. He had a profound respiratory acidosis 6.82/98/158. Assessment/Plan:   Cardiac arrest  Presumed opioid overdose - Relapse per girlfriend  - Stat head CT / EEG, presumed anoxic brain injury  - Avoid hyperthermia, Cooling for temp >36.5     Hypoxic / hypercapnic respiratory failure  Aspiration   - send sputum cx / procal   - broad spectrum abx     Shock   Cardiogenic / volume depletion / sepsis    - Continue pressor support  - Echo ordered      Next of kin: mother Rayshawn Lyon     I personally spent 120 minutes of critical care time. This is time spent at this critically ill patient's bedside actively involved in patient care as well as the coordination of care and discussions with the patient's family.   This does not include any procedural time which has been billed separately. Addendum - Patient had an acute episode of rapidly increased vasopressor need and hypoxia. Bedside echo with global hypokinesis. Called US for echo again. CXR without change. Spoke with family. They agreed to DNR given the grave prognosis. HOSPITAL COURSE:  Brief hospitalization. He was stabilized on mechanical ventilation and vasopressors. On the day following admission, his exam was consistent with brain death. Apnea test was performed confirming the diagnosis of brain death.  [employer] was notified and he was made an organ donor    CAUSE OF DEATH:  Brain death due to hypoxic-ischemic encephalopathy due to prolonged cardiorespiratory arrest due to opiate overdose    OTHER DIAGNOSES/CONDITIONS AT TIME OF DEATH:  Acute hypoxic/hypercapnic resp failure  Circulatory shock  Aspiration PNA  Noncardiogenic pulmonary edema  JOSE MARTIN  Metabolic acidosis  Elevated procalcitonin      Ruth Campa MD  [address]  [phone]  3/3/2023 6:48 PM

## 2023-03-03 NOTE — PROGRESS NOTES
Bedside shift change report given to Evan Garza RN (oncoming nurse) by Faizan Metz RN (offgoing nurse). Report included the following information SBAR, Kardex, ED Summary, Intake/Output, MAR, Recent Results, Cardiac Rhythm Sinus Tach, Alarm Parameters , Quality Measures, and Dual Neuro Assessment. 1103: Time out performed for minal placement by RN with Dr. Kori Nichols. 1607: Epoc completed per order, creatine resulted at 3.47. Bedside shift change report given to Alison RN (oncoming nurse) by Reji Rhodes RN (offgoing nurse). Report included the following information SBAR, Kardex, ED Summary, Intake/Output, MAR, Recent Results, Cardiac Rhythm Sinus Tach, Alarm Parameters , Quality Measures, and Dual Neuro Assessment.

## 2023-03-03 NOTE — CONSULTS
Nephrology Consult Note     Dony Lyle                Phone - [phone]   Patient: Pipo Marc   YOB: 1990    Date- 3/3/2023  MRN: [id number]             REASON FOR CONSULTATION: JOSE MARTIN stage III  CONSULTING PHYSICIAN:   ADMIT DATE:3/2/2023 PATIENT Ron Melara MD     IMPRESSION & PLAN:   JOSE MARTIN stage III(secondary to ATN)  Cardiogenic shock  PEA cardiac arrest  Substance abuse  Acute hypoxic/hypercapnic respiratory failure    PLAN-  Plan for conventional HD today  [facility] is involved in patient care now. Plan for organ procurement by tomorrow  Discussed with ICU staff, ICU team will place José catheter for HD today. [facility] team has been notified       Active Problems:    Brain death (3/2/2023)        [x] High complexity decision making was performed  [x] Patient is at high-risk of decompensation with multiple organ involvement    Subjective:   HPI: Pipo Marc is a 29 y.o. male who was admitted on 3/1 after being found down for unknown duration of time. Apparently he history of drug abuse. Girlfriend gave him Narcan and called EMS. Chest compressions were started and ROSC was obtained in 1 minute then he went into asystole again. He received 300 mg of amio 7 rounds of epi and 4 mg Narcan and was shocked twice for V. tach. Per EMS he lost his pulse again at 20 minutes. He was brought to the ED at [facility] and was found pulseless on arrival.  He received ROSC x2 in the field and lost pulse again. He was intubated by EMS. ROSC was achieved in the ER. He was started on norepinephrine . later on he was weaned off and is not requiring any more pressor support. His drug screen was positive for methadone, opiates and THC. Initial CT scan of head on admission shows significant anoxic brain injury. His baseline creatinine is normal and creatinine on arrival was elevated 1.6 and has worsened to 5.27.   Renal consult requested for evaluation of JOSE MARTIN. Review of Systems:       Past Medical History:   Diagnosis Date    Anxiety     Bipolar affective ([facility]     Heroin addiction ([facility]       Past Surgical History:   Procedure Laterality Date    HX OTHER SURGICAL      left arm      Prior to Admission medications    Medication Sig Start Date End Date Taking? Authorizing Provider   ketorolac (TORADOL) 10 mg tablet Take 1 Tab by mouth every six (6) hours as needed for Pain for up to 10 doses. 4/25/21   TERESA Yañez   diazePAM (Valium) 5 mg tablet Take 1 Tab by mouth every eight (8) hours as needed (spasm/pain) for up to 8 doses. Max Daily Amount: 15 mg. 4/25/21   TERESA Yañez   naloxone [facility]) 4 mg/actuation nasal spray Use 1 spray intranasally, then discard. Repeat with new spray every 2 min as needed for opioid overdose symptoms, alternating nostrils. 4/25/21   TERESA Yañez   methadone HCl (METHADONE PO) Take 240 mg by mouth. Provider, Historical   QUEtiapine (SEROQUEL) 100 mg tablet Take 1 Tab by mouth two (2) times a day. Patient taking differently: Take 100 mg by mouth two (2) times a day. Indications: Pt state he takes 1 tab at night. 2/6/12   Kerline Whitt MD   QUEtiapine (SEROQUEL) 300 mg tablet Take 1 Tab by mouth nightly.  2/6/12   Kerline Whitt MD     No Known Allergies   Social History     Tobacco Use    Smoking status: Former    Smokeless tobacco: Never   Substance Use Topics    Alcohol use: Not Currently      Family History   Problem Relation Age of Onset    No Known Problems Mother     No Known Problems Father         Objective:    Patient Vitals for the past 24 hrs:   Temp Pulse Resp BP SpO2   03/03/23 0926 -- -- -- 130/73 --   03/03/23 0921 -- -- -- 130/73 --   03/03/23 0900 -- (!) 105 20 124/64 99 %   03/03/23 0840 -- (!) 103 20 -- 98 %   03/03/23 0838 -- -- -- -- 99 %   03/03/23 0800 97.6 °F (36.4 °C) (!) 108 20 130/73 99 %   03/03/23 0700 -- (!) 111 20 126/70 99 %   03/03/23 0630 -- (!) 111 20 -- 99 %   03/03/23 0615 -- (!) 110 20 -- 100 %   03/03/23 0614 -- -- -- -- 100 %   03/03/23 0600 -- (!) 114 20 124/69 99 %   03/03/23 0545 -- (!) 114 20 -- 99 %   03/03/23 0530 -- (!) 116 20 -- 99 %   03/03/23 0515 -- (!) 118 20 -- 99 %   03/03/23 0500 (!) 96.3 °F (35.7 °C) (!) 120 20 128/68 99 %   03/03/23 0445 (!) 96.4 °F (35.8 °C) (!) 121 20 -- 99 %   03/03/23 0430 (!) 96.4 °F (35.8 °C) (!) 123 20 -- 99 %   03/03/23 0415 (!) 96.4 °F (35.8 °C) (!) 124 20 -- 98 %   03/03/23 0400 (!) 96.4 °F (35.8 °C) (!) 126 20 127/70 98 %   03/03/23 0345 (!) 96.4 °F (35.8 °C) (!) 126 20 -- 98 %   03/03/23 0338 -- (!) 125 20 -- 98 %   03/03/23 0330 (!) 96.6 °F (35.9 °C) (!) 125 20 -- 98 %   03/03/23 0315 96.8 °F (36 °C) (!) 127 20 -- 98 %   03/03/23 0300 97 °F (36.1 °C) (!) 124 20 126/69 98 %   03/03/23 0245 97 °F (36.1 °C) (!) 124 20 124/72 98 %   03/03/23 0200 97.2 °F (36.2 °C) (!) 129 20 (!) 140/82 100 %   03/03/23 0145 97 °F (36.1 °C) (!) 130 20 -- 100 %   03/03/23 0130 97 °F (36.1 °C) (!) 132 20 -- 98 %   03/03/23 0115 96.8 °F (36 °C) (!) 132 20 -- 98 %   03/03/23 0100 (!) 96.6 °F (35.9 °C) (!) 133 20 (!) 144/85 98 %   03/03/23 0045 (!) 96.6 °F (35.9 °C) (!) 133 20 -- 98 %   03/03/23 0030 (!) 96.4 °F (35.8 °C) (!) 133 20 -- 99 %   03/03/23 0018 -- (!) 131 20 -- 99 %   03/03/23 0015 (!) 96.4 °F (35.8 °C) (!) 131 20 -- 99 %   03/03/23 0000 (!) 96.4 °F (35.8 °C) (!) 130 21 (!) 150/91 99 %   03/02/23 2345 (!) 96.4 °F (35.8 °C) (!) 130 20 -- 99 %   03/02/23 2330 (!) 96.4 °F (35.8 °C) (!) 130 20 -- 98 %   03/02/23 2315 (!) 96.4 °F (35.8 °C) (!) 130 20 -- 99 %   03/02/23 2300 (!) 96.6 °F (35.9 °C) (!) 130 20 139/83 98 %   03/02/23 2245 (!) 96.6 °F (35.9 °C) (!) 133 20 -- 97 %   03/02/23 2230 (!) 96.6 °F (35.9 °C) (!) 135 20 -- 97 %   03/02/23 2215 (!) 96.6 °F (35.9 °C) (!) 134 20 -- 97 %   03/02/23 2200 96.8 °F (36 °C) (!) 131 24 117/81 99 %   03/02/23 2145 96.8 °F (36 °C) (!) 127 24 -- 99 %   03/02/23 2137 -- -- -- -- 99 % 03/02/23 2130 96.8 °F (36 °C) (!) 121 24 -- 98 %   03/02/23 2115 96.8 °F (36 °C) (!) 117 24 -- 98 %   03/02/23 2100 96.8 °F (36 °C) (!) 115 24 (!) 77/56 97 %   03/02/23 2045 96.8 °F (36 °C) (!) 118 24 -- 96 %   03/02/23 2030 96.8 °F (36 °C) (!) 118 24 -- 96 %   03/02/23 2015 96.8 °F (36 °C) (!) 118 24 -- 95 %   03/02/23 2000 96.8 °F (36 °C) (!) 114 24 129/85 96 %   03/02/23 1945 96.8 °F (36 °C) (!) 114 24 116/74 95 %   03/02/23 1930 (!) 96.6 °F (35.9 °C) (!) 115 24 -- 94 %   03/02/23 1924 -- (!) 116 24 -- 94 %   03/02/23 1915 (!) 96.6 °F (35.9 °C) (!) 118 24 -- 94 %   03/02/23 1900 (!) 96.6 °F (35.9 °C) (!) 120 24 -- 94 %   03/02/23 1800 (!) 96.4 °F (35.8 °C) (!) 121 24 -- 98 %     03/03 0701 - 03/03 1900  In: 170 [I.V.:170]  Out: 475 [Urine:75]  Last 3 Recorded Weights in this Encounter    03/03/23 0921 03/03/23 0926   Weight: 83.5 kg (184 lb) 83.5 kg (184 lb)      Physical Exam:  General:Alert, No distress,   Eyes:No scleral icterus, No conjunctival pallor  Neck:Supple,no mass palpable,no thyromegaly  Lungs:Clears to auscultation Bilaterally, normal respiratory effort  CVS:RRR, S1 S2 normal,  No rub,  Abdomen:Soft, Non tender, No hepatosplenomegaly  Extremities: No LE edema  Skin:No rash or lesions, Warm and DRY   : Catheter  Musculoskeletal : no redness, no joint tenderness  NEURO: Unable to assess    CODE STATUS: Full  Care Plan discussed with: ICU team     Chart reviewed. Lab and Radiology Data Personally Reviewed: (see below)    Recent Labs     03/03/23 0323 03/02/23 1938 03/02/23 0249    140 138   K 3.4* 3.7 3.6    107 106   CO2 22 22 22   BUN 43* 41* 31*   CREA 5.27* 4.48* 2.67*   * 90 137*   CA 8.2* 7.7* 7.5*   MG 2.1 1.8 1.5*   PHOS 3.0 3.9 3.5     Recent Labs     03/03/23 0323 03/02/23 1938 03/02/23 0249   WBC 14.0* 16.7* 7.5   HGB 11.0* 12.3 14.1   HCT 31.0* 34.5* 41.7    176 246     No results for input(s): FE, TIBC, PSAT, FERR in the last 72 hours.    Lab Results Component Value Date/Time    Hemoglobin A1c 5.4 03/02/2023 07:38 PM      Lab Results   Component Value Date/Time    Culture result: NO GROWTH AFTER 9 HOURS 03/02/2023 09:02 PM    Culture result: NO GROWTH AFTER 9 HOURS 03/02/2023 09:02 PM    Culture result: PENDING 03/02/2023 07:38 PM    Culture result: PENDING 03/02/2023 07:38 PM     No results found for: MCACR, MCA1, MCA2, MCA3, MCAU, MCAU2, MCALPOCT  Lab Results   Component Value Date/Time    Color YELLOW/STRAW 03/03/2023 12:57 AM    Appearance CLEAR 03/03/2023 12:57 AM    Specific gravity 1.014 03/03/2023 12:57 AM    Specific gravity >1.030 (H) 03/01/2023 03:12 PM    pH (UA) 5.0 03/03/2023 12:57 AM    Protein 30 (A) 03/03/2023 12:57 AM    Glucose 100 (A) 03/03/2023 12:57 AM    Ketone Negative 03/03/2023 12:57 AM    Bilirubin Negative 03/03/2023 12:57 AM    Urobilinogen 0.2 03/03/2023 12:57 AM    Nitrites Negative 03/03/2023 12:57 AM    Leukocyte Esterase TRACE (A) 03/03/2023 12:57 AM    Epithelial cells FEW 03/03/2023 12:57 AM    Bacteria 3+ (A) 03/03/2023 12:57 AM    WBC 0-4 03/03/2023 12:57 AM    RBC 0-5 03/03/2023 12:57 AM     No results found for: BNP, BNPP, BNPPPOC, XBNPT, BNPNT  US Results (most recent):  No results found for this or any previous visit. ECHO ADULT COMPLETE    Left Ventricle: Normal left ventricular systolic function with a   visually estimated EF of 55 - 60%. Left ventricle size is normal. Normal   wall thickness. Normal wall motion. Normal diastolic function. Technical qualifiers: Echo study was technically difficult. CT CHEST WO CONT  Narrative: INDICATION:   organ donation     EXAMINATION:  CT CHEST, ABD, PELVIS WO CONTRAST    COMPARISON:  None    TECHNIQUE:  CT imaging of the chest, abdomen and pelvis was performed without  contrast.  Coronal and sagittal reconstructions were obtained.   CT dose  reduction was achieved through use of a standardized protocol tailored for this  examination and automatic exposure control for dose modulation. FINDINGS:    THYROID: Unremarkable. MEDIASTINUM/PATRICK: No mass or lymphadenopathy. HEART/PERICARDIUM: Unremarkable. Coronary artery calcification:  1 absent  LUNGS/PLEURA: Ill-defined nodular airspace disease in both lungs with more focal  consolidation in the lower lobes and right middle lobe. Small right pleural  effusion. No pneumothorax. BONES: No destructive bone lesion. ADDITIONAL COMMENTS:  N/A    LIVER: No mass or biliary dilatation. GALLBLADDER: Unremarkable. SPLEEN: No enlargement or lesion. PANCREAS: No mass or ductal dilatation. ADRENALS: No mass. KIDNEYS: No mass, calculus, or hydronephrosis. GI TRACT:  Rectal tube. Fluid-filled ascending colon. No bowel obstruction  PERITONEUM: Mild ascites. No free air. APPENDIX: Unremarkable. Eulalia Avery RETROPERITONEUM: No aortic aneurysm. LYMPH NODES:  None enlarged. ADDITIONAL COMMENTS: Right inguinal femoral line. URINARY BLADDER: Decompressed by Lopez catheter. LYMPH NODES:  None enlarged. REPRODUCTIVE ORGANS: Unremarkable. FREE FLUID:  Small. BONES: No destructive bone lesion. ADDITIONAL COMMENTS: N/A. Impression: 1. Patchy bilateral airspace disease. Small right pleural effusion. 2. Small volume ascites in the abdomen/pelvis. CT ABD PELV WO CONT  Narrative: INDICATION:   organ donation     EXAMINATION:  CT CHEST, ABD, PELVIS WO CONTRAST    COMPARISON:  None    TECHNIQUE:  CT imaging of the chest, abdomen and pelvis was performed without  contrast.  Coronal and sagittal reconstructions were obtained. CT dose  reduction was achieved through use of a standardized protocol tailored for this  examination and automatic exposure control for dose modulation. FINDINGS:    THYROID: Unremarkable. MEDIASTINUM/PATRICK: No mass or lymphadenopathy. HEART/PERICARDIUM: Unremarkable.   Coronary artery calcification:  1 absent  LUNGS/PLEURA: Ill-defined nodular airspace disease in both lungs with more focal  consolidation in the lower lobes and right middle lobe. Small right pleural  effusion. No pneumothorax. BONES: No destructive bone lesion. ADDITIONAL COMMENTS:  N/A    LIVER: No mass or biliary dilatation. GALLBLADDER: Unremarkable. SPLEEN: No enlargement or lesion. PANCREAS: No mass or ductal dilatation. ADRENALS: No mass. KIDNEYS: No mass, calculus, or hydronephrosis. GI TRACT:  Rectal tube. Fluid-filled ascending colon. No bowel obstruction  PERITONEUM: Mild ascites. No free air. APPENDIX: Unremarkable. Oscar Cast RETROPERITONEUM: No aortic aneurysm. LYMPH NODES:  None enlarged. ADDITIONAL COMMENTS: Right inguinal femoral line. URINARY BLADDER: Decompressed by Lopez catheter. LYMPH NODES:  None enlarged. REPRODUCTIVE ORGANS: Unremarkable. FREE FLUID:  Small. BONES: No destructive bone lesion. ADDITIONAL COMMENTS: N/A. Impression: 1. Patchy bilateral airspace disease. Small right pleural effusion. 2. Small volume ascites in the abdomen/pelvis. Prior to Admission Medications   Prescriptions Last Dose Informant Patient Reported? Taking? QUEtiapine (SEROQUEL) 100 mg tablet   No No   Sig: Take 1 Tab by mouth two (2) times a day. Patient taking differently: Take 100 mg by mouth two (2) times a day. Indications: Pt state he takes 1 tab at night. QUEtiapine (SEROQUEL) 300 mg tablet   No No   Sig: Take 1 Tab by mouth nightly. diazePAM (Valium) 5 mg tablet   No No   Sig: Take 1 Tab by mouth every eight (8) hours as needed (spasm/pain) for up to 8 doses. Max Daily Amount: 15 mg.   ketorolac (TORADOL) 10 mg tablet   No No   Sig: Take 1 Tab by mouth every six (6) hours as needed for Pain for up to 10 doses. methadone HCl (METHADONE PO)   Yes No   Sig: Take 240 mg by mouth.   naloxone (NARCAN) 4 mg/actuation nasal spray   No No   Sig: Use 1 spray intranasally, then discard. Repeat with new spray every 2 min as needed for opioid overdose symptoms, alternating nostrils.       Facility-Administered Medications: None Imaging:    Medications list Personally Reviewed   [x]      Yes     []               No    Thank you for allowing us to participate in the care this patient. We will follow patient with you.   Signed By: Allison Peña [facility]  [facility] SYSTM FRANCISMADISON [facility] JOSE Mann [address], [address]  [place], [address]  Phone - [phone]         Fax - [phone] [facility] Office  [address] [place] [address]  Phone - [phone]        Fax - [phone]

## 2023-03-03 NOTE — PROGRESS NOTES
1900 Bedside shift change report given to Ayaz Salas RN (oncoming nurse) by Livia Correa RN (offgoing nurse). Report included the following information SBAR, Kardex, ED Summary, Intake/Output, MAR, Accordion, Recent Results, Med Rec Status, Cardiac Rhythm ST, and Alarm Parameters . 2000 Assessment complete. Patient is unresponsive to everything. Patient is with [facility] for organ donation. UOP 30 mL.    2241 [facility] ordered Calcium Gluconate 2 g, Potassium Chloride 20 mEq, Magnesium Chloride 1 g, Albumin 25% 25 g, and 1/2  mL bolus. 2351 [facility] ordered Lasix 20 mg due to no urine output. 0000 Assessment complete. 0035 Lasix 40 mg ordered by [facility]. 0100 UOP 50 mL.    0300 HCO3 100 mEq given for BG HCO3 of 19. [place] [address]    0345 HCO3 remains 19.    0400 Assessment complete. [facility] to start CRRT for increasing creat level and little to no UOP. Order placed to insert minal for CRRT.    0620 Replaced 1/2 NS to D5W with 2 amps of HCO3 at 76.    0702 Dr. Tello Garcia from [facility] called for a consult. 0715 Bedside shift change report given to Sahil Ricks RN (oncoming nurse) by Ayaz Salas RN (offgoing nurse). Report included the following information SBAR, Kardex, ED Summary, Intake/Output, MAR, Accordion, Recent Results, Med Rec Status, Cardiac Rhythm ST, and Alarm Parameters .

## 2023-03-03 NOTE — PROCEDURES
Hemodialysis / [phone]      Orders   Duration: Start: 2346 End: 1535 Total: 3 Hours   Dialyzer: Dialyzer/Set Up Inspection: Sharonda Mask (03/03/23 1235)   K Bath: Dialysate K (mEq/L): 3 (03/03/23 1235)   Ca Bath: Dialysate CA (mEq/L): 2.5 (03/03/23 1235)   Na: Dialysate NA (mEq/L): 138 (03/03/23 1235)   Bicarb: Dialysate HCO3 (mEq/L): 35 (03/03/23 1235)   Target Fluid Removal: Goal/Amount of Fluid to Remove (mL): 0 mL (03/03/23 1235)     Access   Type & Location: SIDRA Giovanni Kumar   Comments: Pre- Assessment: Dressing CDI and dated 3/3/23. No s/s of infection. Both lumens aspirate & flush well. Running well at . Post assessment: Dressing CDI. No changes. Labs   HBsAg (Antigen) / date: Negative Ag 3/1/23                                              HBsAb (Antibody) / date: Unknown   Source: Epic   Obtained/Reviewed  Critical Results Called HGB   Date Value Ref Range Status   03/03/2023 10.2 (L) 12.1 - 17.0 g/dL Final     Potassium   Date Value Ref Range Status   03/03/2023 3.6 3.5 - 5.1 mmol/L Final     Calcium   Date Value Ref Range Status   03/03/2023 8.2 (L) 8.5 - 10.1 MG/DL Final     BUN   Date Value Ref Range Status   03/03/2023 50 (H) 6 - 20 MG/DL Final     Creatinine   Date Value Ref Range Status   03/03/2023 6.17 (H) 0.70 - 1.30 MG/DL Final        Meds Given   Name Dose Route   none                 Adequacy / Fluid    Total Liters Process: 49.6 L   Net Fluid Removed: 0 ML      Comments   Time Out Done:   (Time) 1233   Admitting Diagnosis: Brain Death   Consent obtained/signed: Informed Consent Verified: Yes ([facility] Organ [facility]or) (03/03/23 1235)   Machine / RO # Machine Number: Alyce Inches (03/03/23 1235)   Primary Nurse Rpt Pre: Lilian Jaime RN   Primary Nurse Rpt Post: Lilian Jaime RN   Pt/family Education: HD Treatment   Care Plan: Ongoing   Pts outpatient clinic: None     Tx Summary   Comments:  SBAR received from Primary RN. Arrived to pt's bedside. Pt unresponsive.  No consent needed d/t pt being a [facility] organ donor case. 1235: Each catheter limb disinfected per p&p, caps removed, hubs disinfected per p&p. Each lumen aspirated for blood return and flushed with Normal Saline per policy. VSS. Dialysis Tx initiated. **Patient tolerated treatment well without complications. All lines and connections remained intact and visible throughout entire treatment. **    1535: Tx ended. VSS. Each dialysis catheter limb disinfected per p&p, all possible blood returned per p&p, and each dialysis hub disinfected per p&p. Each lumen flushed and caps applied. Bed locked and in the lowest position, call bell and belongings in reach. SBAR given to Christiano, RN. Patient is stable at time of my departure. All Dialysis related medications have been reviewed.

## 2023-03-03 NOTE — PROGRESS NOTES
Patient is now an organ donor and care is being dictated by [facility] providers.  I placed HD catheter today at their request. CCM Service will remain available as needed    Elisha Infante, [facility],[address]  [phone]  3/3/2023 11:24 AM

## 2023-03-03 NOTE — ED NOTES
Initial lactic acid drawn at 1333PM 3/1/23 and resulted at 1351PM 3/1/23 at 14.27. Results required manual crossover from POCT coordinator.

## 2023-03-04 ENCOUNTER — APPOINTMENT (OUTPATIENT)
Dept: GENERAL RADIOLOGY | Age: 33
End: 2023-03-04
Payer: COMMERCIAL

## 2023-03-04 LAB
ALBUMIN SERPL-MCNC: 2.5 G/DL (ref 3.5–5)
ALBUMIN SERPL-MCNC: 2.6 G/DL (ref 3.5–5)
ALBUMIN SERPL-MCNC: 2.7 G/DL (ref 3.5–5)
ALBUMIN/GLOB SERPL: 0.9 (ref 1.1–2.2)
ALP SERPL-CCNC: 69 U/L (ref 45–117)
ALP SERPL-CCNC: 91 U/L (ref 45–117)
ALP SERPL-CCNC: 98 U/L (ref 45–117)
ALT SERPL-CCNC: 213 U/L (ref 12–78)
ALT SERPL-CCNC: 235 U/L (ref 12–78)
ALT SERPL-CCNC: 278 U/L (ref 12–78)
AMORPH CRY URNS QL MICRO: ABNORMAL
ANION GAP SERPL CALC-SCNC: 10 MMOL/L (ref 5–15)
ANION GAP SERPL CALC-SCNC: 7 MMOL/L (ref 5–15)
ANION GAP SERPL CALC-SCNC: 8 MMOL/L (ref 5–15)
APPEARANCE UR: ABNORMAL
APTT PPP: 32.5 SEC (ref 22.1–31)
APTT PPP: 32.5 SEC (ref 22.1–31)
APTT PPP: 34.2 SEC (ref 22.1–31)
ARTERIAL PATENCY WRIST A: ABNORMAL
AST SERPL-CCNC: 202 U/L (ref 15–37)
AST SERPL-CCNC: 286 U/L (ref 15–37)
AST SERPL-CCNC: 322 U/L (ref 15–37)
BACTERIA SPEC CULT: ABNORMAL
BACTERIA SPEC CULT: NORMAL
BACTERIA URNS QL MICRO: NEGATIVE /HPF
BASE DEFICIT BLDA-SCNC: 0.6 MMOL/L
BASE EXCESS BLD CALC-SCNC: 0.5 MMOL/L
BASE EXCESS BLDA CALC-SCNC: 2.3 MMOL/L
BASE EXCESS BLDA CALC-SCNC: 2.5 MMOL/L
BASOPHILS # BLD: 0 K/UL (ref 0–0.1)
BASOPHILS NFR BLD: 0 % (ref 0–1)
BDY SITE: ABNORMAL
BILIRUB DIRECT SERPL-MCNC: 0.4 MG/DL (ref 0–0.2)
BILIRUB SERPL-MCNC: 1.2 MG/DL (ref 0.2–1)
BILIRUB SERPL-MCNC: 1.4 MG/DL (ref 0.2–1)
BILIRUB SERPL-MCNC: 1.4 MG/DL (ref 0.2–1)
BILIRUB UR QL: NEGATIVE
BUN SERPL-MCNC: 34 MG/DL (ref 6–20)
BUN SERPL-MCNC: 50 MG/DL (ref 6–20)
BUN SERPL-MCNC: 56 MG/DL (ref 6–20)
BUN/CREAT SERPL: 7 (ref 12–20)
BUN/CREAT SERPL: 9 (ref 12–20)
BUN/CREAT SERPL: 9 (ref 12–20)
CALCIUM SERPL-MCNC: 8.6 MG/DL (ref 8.5–10.1)
CALCIUM SERPL-MCNC: 8.7 MG/DL (ref 8.5–10.1)
CALCIUM SERPL-MCNC: 9.1 MG/DL (ref 8.5–10.1)
CHLORIDE SERPL-SCNC: 105 MMOL/L (ref 97–108)
CHLORIDE SERPL-SCNC: 106 MMOL/L (ref 97–108)
CHLORIDE SERPL-SCNC: 107 MMOL/L (ref 97–108)
CK SERPL-CCNC: 132 U/L (ref 39–308)
CK SERPL-CCNC: 80 U/L (ref 39–308)
CK SERPL-CCNC: 94 U/L (ref 39–308)
CO2 SERPL-SCNC: 25 MMOL/L (ref 21–32)
CO2 SERPL-SCNC: 26 MMOL/L (ref 21–32)
CO2 SERPL-SCNC: 27 MMOL/L (ref 21–32)
COLOR UR: ABNORMAL
COMMENT, HOLDF: NORMAL
CREAT SERPL-MCNC: 4.82 MG/DL (ref 0.7–1.3)
CREAT SERPL-MCNC: 5.8 MG/DL (ref 0.7–1.3)
CREAT SERPL-MCNC: 6.31 MG/DL (ref 0.7–1.3)
DIFFERENTIAL METHOD BLD: ABNORMAL
EOSINOPHIL # BLD: 0 K/UL (ref 0–0.4)
EOSINOPHIL NFR BLD: 0 % (ref 0–7)
EPITH CASTS URNS QL MICRO: ABNORMAL /LPF
ERYTHROCYTE [DISTWIDTH] IN BLOOD BY AUTOMATED COUNT: 13.1 % (ref 11.5–14.5)
ERYTHROCYTE [DISTWIDTH] IN BLOOD BY AUTOMATED COUNT: 13.5 % (ref 11.5–14.5)
ERYTHROCYTE [DISTWIDTH] IN BLOOD BY AUTOMATED COUNT: 13.7 % (ref 11.5–14.5)
FIO2 ON VENT: 100 %
GAS FLOW.O2 SETTING OXYMISER: 20
GLOBULIN SER CALC-MCNC: 2.7 G/DL (ref 2–4)
GLOBULIN SER CALC-MCNC: 2.9 G/DL (ref 2–4)
GLOBULIN SER CALC-MCNC: 2.9 G/DL (ref 2–4)
GLUCOSE BLD STRIP.AUTO-MCNC: 103 MG/DL (ref 65–117)
GLUCOSE BLD STRIP.AUTO-MCNC: 106 MG/DL (ref 65–117)
GLUCOSE BLD STRIP.AUTO-MCNC: 114 MG/DL (ref 65–117)
GLUCOSE BLD STRIP.AUTO-MCNC: 131 MG/DL (ref 65–117)
GLUCOSE BLD STRIP.AUTO-MCNC: 131 MG/DL (ref 65–117)
GLUCOSE BLD STRIP.AUTO-MCNC: 133 MG/DL (ref 65–117)
GLUCOSE BLD STRIP.AUTO-MCNC: 135 MG/DL (ref 65–117)
GLUCOSE BLD STRIP.AUTO-MCNC: 145 MG/DL (ref 65–117)
GLUCOSE BLD STRIP.AUTO-MCNC: 146 MG/DL (ref 65–117)
GLUCOSE BLD STRIP.AUTO-MCNC: 154 MG/DL (ref 65–117)
GLUCOSE BLD STRIP.AUTO-MCNC: 160 MG/DL (ref 65–117)
GLUCOSE BLD STRIP.AUTO-MCNC: 168 MG/DL (ref 65–117)
GLUCOSE BLD STRIP.AUTO-MCNC: 174 MG/DL (ref 65–117)
GLUCOSE BLD STRIP.AUTO-MCNC: 178 MG/DL (ref 65–117)
GLUCOSE BLD STRIP.AUTO-MCNC: 181 MG/DL (ref 65–117)
GLUCOSE BLD STRIP.AUTO-MCNC: 189 MG/DL (ref 65–117)
GLUCOSE BLD STRIP.AUTO-MCNC: 195 MG/DL (ref 65–117)
GLUCOSE BLD STRIP.AUTO-MCNC: 98 MG/DL (ref 65–117)
GLUCOSE SERPL-MCNC: 119 MG/DL (ref 65–100)
GLUCOSE SERPL-MCNC: 141 MG/DL (ref 65–100)
GLUCOSE SERPL-MCNC: 188 MG/DL (ref 65–100)
GLUCOSE UR STRIP.AUTO-MCNC: 100 MG/DL
GRAM STN SPEC: ABNORMAL
HCO3 BLD-SCNC: 24.2 MMOL/L (ref 22–26)
HCO3 BLDA-SCNC: 22 MMOL/L (ref 22–26)
HCO3 BLDA-SCNC: 26 MMOL/L (ref 22–26)
HCO3 BLDA-SCNC: 26 MMOL/L (ref 22–26)
HCT VFR BLD AUTO: 25.7 % (ref 36.6–50.3)
HCT VFR BLD AUTO: 26.4 % (ref 36.6–50.3)
HCT VFR BLD AUTO: 26.4 % (ref 36.6–50.3)
HGB BLD-MCNC: 9.4 G/DL (ref 12.1–17)
HGB BLD-MCNC: 9.5 G/DL (ref 12.1–17)
HGB BLD-MCNC: 9.7 G/DL (ref 12.1–17)
HGB UR QL STRIP: ABNORMAL
IMM GRANULOCYTES # BLD AUTO: 0 K/UL (ref 0–0.04)
IMM GRANULOCYTES NFR BLD AUTO: 0 % (ref 0–0.5)
INR PPP: 1.2 (ref 0.9–1.1)
INR PPP: 1.2 (ref 0.9–1.1)
INR PPP: 1.3 (ref 0.9–1.1)
KETONES UR QL STRIP.AUTO: NEGATIVE MG/DL
LEUKOCYTE ESTERASE UR QL STRIP.AUTO: ABNORMAL
LYMPHOCYTES # BLD: 0.2 K/UL (ref 0.8–3.5)
LYMPHOCYTES # BLD: 0.3 K/UL (ref 0.8–3.5)
LYMPHOCYTES # BLD: 0.7 K/UL (ref 0.8–3.5)
LYMPHOCYTES NFR BLD: 1 % (ref 12–49)
LYMPHOCYTES NFR BLD: 1 % (ref 12–49)
LYMPHOCYTES NFR BLD: 3 % (ref 12–49)
MAGNESIUM SERPL-MCNC: 1.9 MG/DL (ref 1.6–2.4)
MAGNESIUM SERPL-MCNC: 2.1 MG/DL (ref 1.6–2.4)
MAGNESIUM SERPL-MCNC: 2.3 MG/DL (ref 1.6–2.4)
MCH RBC QN AUTO: 31 PG (ref 26–34)
MCH RBC QN AUTO: 31.4 PG (ref 26–34)
MCH RBC QN AUTO: 31.6 PG (ref 26–34)
MCHC RBC AUTO-ENTMCNC: 36 G/DL (ref 30–36.5)
MCHC RBC AUTO-ENTMCNC: 36.6 G/DL (ref 30–36.5)
MCHC RBC AUTO-ENTMCNC: 36.7 G/DL (ref 30–36.5)
MCV RBC AUTO: 86 FL (ref 80–99)
MCV RBC AUTO: 86 FL (ref 80–99)
MCV RBC AUTO: 86.3 FL (ref 80–99)
MONOCYTES # BLD: 0.5 K/UL (ref 0–1)
MONOCYTES # BLD: 1 K/UL (ref 0–1)
MONOCYTES # BLD: 1.1 K/UL (ref 0–1)
MONOCYTES NFR BLD: 2 % (ref 5–13)
MONOCYTES NFR BLD: 4 % (ref 5–13)
MONOCYTES NFR BLD: 5 % (ref 5–13)
NEUTS BAND NFR BLD MANUAL: 2 %
NEUTS BAND NFR BLD MANUAL: 7 %
NEUTS BAND NFR BLD MANUAL: 7 %
NEUTS SEG # BLD: 18.8 K/UL (ref 1.8–8)
NEUTS SEG # BLD: 21.8 K/UL (ref 1.8–8)
NEUTS SEG # BLD: 25.1 K/UL (ref 1.8–8)
NEUTS SEG NFR BLD: 87 % (ref 32–75)
NEUTS SEG NFR BLD: 88 % (ref 32–75)
NEUTS SEG NFR BLD: 93 % (ref 32–75)
NITRITE UR QL STRIP.AUTO: NEGATIVE
NRBC # BLD: 0 K/UL (ref 0–0.01)
NRBC BLD-RTO: 0 PER 100 WBC
PCO2 BLD: 34.9 MMHG (ref 35–45)
PCO2 BLDA: 33 MMHG (ref 35–45)
PCO2 BLDA: 37 MMHG (ref 35–45)
PCO2 BLDA: 38 MMHG (ref 35–45)
PEEP RESPIRATORY: 5
PH BLD: 7.45 (ref 7.35–7.45)
PH BLDA: 7.46 (ref 7.35–7.45)
PH UR STRIP: 5 (ref 5–8)
PHOSPHATE SERPL-MCNC: 2.1 MG/DL (ref 2.6–4.7)
PHOSPHATE SERPL-MCNC: 5.1 MG/DL (ref 2.6–4.7)
PHOSPHATE SERPL-MCNC: 6.1 MG/DL (ref 2.6–4.7)
PLATELET # BLD AUTO: 117 K/UL (ref 150–400)
PLATELET # BLD AUTO: 136 K/UL (ref 150–400)
PLATELET # BLD AUTO: 142 K/UL (ref 150–400)
PMV BLD AUTO: 10 FL (ref 8.9–12.9)
PMV BLD AUTO: 9.4 FL (ref 8.9–12.9)
PMV BLD AUTO: 9.6 FL (ref 8.9–12.9)
PO2 BLD: 467 MMHG (ref 80–100)
PO2 BLDA: 466 MMHG (ref 80–100)
PO2 BLDA: 469 MMHG (ref 80–100)
PO2 BLDA: 481 MMHG (ref 80–100)
POTASSIUM SERPL-SCNC: 3.1 MMOL/L (ref 3.5–5.1)
POTASSIUM SERPL-SCNC: 3.3 MMOL/L (ref 3.5–5.1)
POTASSIUM SERPL-SCNC: 3.3 MMOL/L (ref 3.5–5.1)
PROT SERPL-MCNC: 5.2 G/DL (ref 6.4–8.2)
PROT SERPL-MCNC: 5.5 G/DL (ref 6.4–8.2)
PROT SERPL-MCNC: 5.6 G/DL (ref 6.4–8.2)
PROT UR STRIP-MCNC: ABNORMAL MG/DL
PROTHROMBIN TIME: 12.7 SEC (ref 9–11.1)
PROTHROMBIN TIME: 12.7 SEC (ref 9–11.1)
PROTHROMBIN TIME: 13 SEC (ref 9–11.1)
RBC # BLD AUTO: 2.99 M/UL (ref 4.1–5.7)
RBC # BLD AUTO: 3.06 M/UL (ref 4.1–5.7)
RBC # BLD AUTO: 3.07 M/UL (ref 4.1–5.7)
RBC #/AREA URNS HPF: ABNORMAL /HPF (ref 0–5)
RBC MORPH BLD: ABNORMAL
SAMPLES BEING HELD,HOLD: NORMAL
SAO2 % BLD: 100 % (ref 92–97)
SAO2% DEVICE SAO2% SENSOR NAME: ABNORMAL
SERVICE CMNT-IMP: ABNORMAL
SERVICE CMNT-IMP: NORMAL
SODIUM SERPL-SCNC: 139 MMOL/L (ref 136–145)
SODIUM SERPL-SCNC: 141 MMOL/L (ref 136–145)
SODIUM SERPL-SCNC: 141 MMOL/L (ref 136–145)
SP GR UR REFRACTOMETRY: 1.02
SPECIMEN SITE: ABNORMAL
SPECIMEN TYPE: ABNORMAL
THERAPEUTIC RANGE,PTTT: ABNORMAL SECS (ref 58–77)
TROPONIN I SERPL HS-MCNC: 2157 NG/L (ref 0–76)
TROPONIN I SERPL HS-MCNC: 2698 NG/L (ref 0–76)
TROPONIN I SERPL HS-MCNC: 3018 NG/L (ref 0–76)
UA: UC IF INDICATED,UAUC: ABNORMAL
UROBILINOGEN UR QL STRIP.AUTO: 0.2 EU/DL (ref 0.2–1)
VENTILATION MODE VENT: ABNORMAL
VT SETTING VENT: 570
WBC # BLD AUTO: 20 K/UL (ref 4.1–11.1)
WBC # BLD AUTO: 23 K/UL (ref 4.1–11.1)
WBC # BLD AUTO: 26.5 K/UL (ref 4.1–11.1)
WBC URNS QL MICRO: ABNORMAL /HPF (ref 0–4)

## 2023-03-04 PROCEDURE — 94003 VENT MGMT INPAT SUBQ DAY: CPT

## 2023-03-04 PROCEDURE — 71045 X-RAY EXAM CHEST 1 VIEW: CPT

## 2023-03-04 PROCEDURE — 82803 BLOOD GASES ANY COMBINATION: CPT

## 2023-03-04 PROCEDURE — 74011636637 HC RX REV CODE- 636/637

## 2023-03-04 PROCEDURE — 84484 ASSAY OF TROPONIN QUANT: CPT

## 2023-03-04 PROCEDURE — 74011000636 HC RX REV CODE- 636: Performed by: INTERNAL MEDICINE

## 2023-03-04 PROCEDURE — 74011000250 HC RX REV CODE- 250: Performed by: INTERNAL MEDICINE

## 2023-03-04 PROCEDURE — 82962 GLUCOSE BLOOD TEST: CPT

## 2023-03-04 PROCEDURE — 85610 PROTHROMBIN TIME: CPT

## 2023-03-04 PROCEDURE — 74011000258 HC RX REV CODE- 258: Performed by: INTERNAL MEDICINE

## 2023-03-04 PROCEDURE — 81001 URINALYSIS AUTO W/SCOPE: CPT

## 2023-03-04 PROCEDURE — 74011000258 HC RX REV CODE- 258

## 2023-03-04 PROCEDURE — 84100 ASSAY OF PHOSPHORUS: CPT

## 2023-03-04 PROCEDURE — 74011250636 HC RX REV CODE- 250/636: Performed by: INTERNAL MEDICINE

## 2023-03-04 PROCEDURE — 82248 BILIRUBIN DIRECT: CPT

## 2023-03-04 PROCEDURE — 94640 AIRWAY INHALATION TREATMENT: CPT

## 2023-03-04 PROCEDURE — 36415 COLL VENOUS BLD VENIPUNCTURE: CPT

## 2023-03-04 PROCEDURE — 85025 COMPLETE CBC W/AUTO DIFF WBC: CPT

## 2023-03-04 PROCEDURE — 80048 BASIC METABOLIC PNL TOTAL CA: CPT

## 2023-03-04 PROCEDURE — 80053 COMPREHEN METABOLIC PANEL: CPT

## 2023-03-04 PROCEDURE — 74011000250 HC RX REV CODE- 250

## 2023-03-04 PROCEDURE — 85730 THROMBOPLASTIN TIME PARTIAL: CPT

## 2023-03-04 PROCEDURE — 94668 MNPJ CHEST WALL SBSQ: CPT

## 2023-03-04 PROCEDURE — 80076 HEPATIC FUNCTION PANEL: CPT

## 2023-03-04 PROCEDURE — P9047 ALBUMIN (HUMAN), 25%, 50ML: HCPCS

## 2023-03-04 PROCEDURE — 74011250636 HC RX REV CODE- 250/636

## 2023-03-04 PROCEDURE — 82550 ASSAY OF CK (CPK): CPT

## 2023-03-04 PROCEDURE — 83735 ASSAY OF MAGNESIUM: CPT

## 2023-03-04 RX ORDER — HEPARIN SODIUM 200 [USP'U]/100ML
INJECTION, SOLUTION INTRAVENOUS
Status: DISCONTINUED | OUTPATIENT
Start: 2023-03-04 | End: 2023-03-05 | Stop reason: HOSPADM

## 2023-03-04 RX ORDER — METOPROLOL TARTRATE 5 MG/5ML
5 INJECTION INTRAVENOUS ONCE
Status: COMPLETED | OUTPATIENT
Start: 2023-03-04 | End: 2023-03-04

## 2023-03-04 RX ORDER — LIDOCAINE HYDROCHLORIDE 10 MG/ML
INJECTION, SOLUTION EPIDURAL; INFILTRATION; INTRACAUDAL; PERINEURAL AS NEEDED
Status: DISCONTINUED | OUTPATIENT
Start: 2023-03-04 | End: 2023-03-05 | Stop reason: HOSPADM

## 2023-03-04 RX ORDER — POTASSIUM CHLORIDE 29.8 MG/ML
20 INJECTION INTRAVENOUS ONCE
Status: COMPLETED | OUTPATIENT
Start: 2023-03-04 | End: 2023-03-04

## 2023-03-04 RX ORDER — ALBUMIN HUMAN 250 G/1000ML
25 SOLUTION INTRAVENOUS ONCE
Status: COMPLETED | OUTPATIENT
Start: 2023-03-04 | End: 2023-03-04

## 2023-03-04 RX ORDER — IODIXANOL 320 MG/ML
INJECTION, SOLUTION INTRAVASCULAR AS NEEDED
Status: DISCONTINUED | OUTPATIENT
Start: 2023-03-04 | End: 2023-03-05 | Stop reason: HOSPADM

## 2023-03-04 RX ADMIN — ALBUTEROL SULFATE 2.5 MG: 2.5 SOLUTION RESPIRATORY (INHALATION) at 20:50

## 2023-03-04 RX ADMIN — PIPERACILLIN AND TAZOBACTAM 3.38 G: 3; .375 INJECTION, POWDER, FOR SOLUTION INTRAVENOUS at 15:21

## 2023-03-04 RX ADMIN — PIPERACILLIN AND TAZOBACTAM 3.38 G: 3; .375 INJECTION, POWDER, FOR SOLUTION INTRAVENOUS at 02:30

## 2023-03-04 RX ADMIN — SODIUM CHLORIDE 5 UNITS/HR: 9 INJECTION, SOLUTION INTRAVENOUS at 19:04

## 2023-03-04 RX ADMIN — POTASSIUM CHLORIDE 20 MEQ: 29.8 INJECTION, SOLUTION INTRAVENOUS at 05:34

## 2023-03-04 RX ADMIN — LEVOTHYROXINE SODIUM ANHYDROUS 20 MCG/HR: 100 INJECTION, POWDER, LYOPHILIZED, FOR SOLUTION INTRAVENOUS at 02:54

## 2023-03-04 RX ADMIN — POTASSIUM PHOSPHATE, MONOBASIC AND POTASSIUM PHOSPHATE, DIBASIC: 224; 236 INJECTION, SOLUTION, CONCENTRATE INTRAVENOUS at 07:26

## 2023-03-04 RX ADMIN — SODIUM CHLORIDE 100 MG/HR: 900 INJECTION INTRAVENOUS at 18:10

## 2023-03-04 RX ADMIN — ALBUTEROL SULFATE 2.5 MG: 2.5 SOLUTION RESPIRATORY (INHALATION) at 23:50

## 2023-03-04 RX ADMIN — ALBUTEROL SULFATE 2.5 MG: 2.5 SOLUTION RESPIRATORY (INHALATION) at 08:45

## 2023-03-04 RX ADMIN — ALBUTEROL SULFATE 2.5 MG: 2.5 SOLUTION RESPIRATORY (INHALATION) at 11:47

## 2023-03-04 RX ADMIN — ALBUTEROL SULFATE 2.5 MG: 2.5 SOLUTION RESPIRATORY (INHALATION) at 16:00

## 2023-03-04 RX ADMIN — ALBUTEROL SULFATE 2.5 MG: 2.5 SOLUTION RESPIRATORY (INHALATION) at 00:16

## 2023-03-04 RX ADMIN — SODIUM CHLORIDE 3.4 UNITS/HR: 9 INJECTION, SOLUTION INTRAVENOUS at 11:27

## 2023-03-04 RX ADMIN — SODIUM CHLORIDE 2.7 UNITS/HR: 9 INJECTION, SOLUTION INTRAVENOUS at 06:57

## 2023-03-04 RX ADMIN — METOPROLOL TARTRATE 5 MG: 5 INJECTION, SOLUTION INTRAVENOUS at 21:51

## 2023-03-04 RX ADMIN — ALBUMIN (HUMAN) 25 G: 0.25 INJECTION, SOLUTION INTRAVENOUS at 06:27

## 2023-03-04 RX ADMIN — SODIUM CHLORIDE 3.1 UNITS/HR: 9 INJECTION, SOLUTION INTRAVENOUS at 10:26

## 2023-03-04 RX ADMIN — SODIUM CHLORIDE 100 MG/HR: 900 INJECTION INTRAVENOUS at 06:56

## 2023-03-04 RX ADMIN — ALBUTEROL SULFATE 2.5 MG: 2.5 SOLUTION RESPIRATORY (INHALATION) at 03:57

## 2023-03-04 NOTE — PROGRESS NOTES
LHC, RHC, cor angio, LV gram, thermal outputs completed s complic   [place]    Findings    1- no angiographically apparent CAD  2- Preserved LV systolic fxn  3- mildly elevated rt and left sided filling pressures  4.  Thermal output 10-11 l/ min     Full report to follow

## 2023-03-04 NOTE — PROGRESS NOTES
5025-2632  Lab reviewed by [facility] Rep > ordered 20 mmol KCL in 50 ml over 1 hour, K-phos 20 mmol in 250 ml over 4 hours, Bicarb Drip stopped,     [id number]  CXR done, Temp down to 96.3 > Zee Hugger applied,       0200  Temp 98.4 > Zee Hugger Turned off,     [id number]  Lab Reviewed with [facility] Rep > re ordered 20 mmol KCL in 50 ml over 1 hour, K-phos 20 mmol in 250 ml over 4 hours, Albumin 25% 25 gram ordered, Synthroid decreased to 10 mcg/hr from 20 mcg/hr,      Bedside and Verbal shift change report given to Kyaw Mc RN (oncoming nurse) by Mariana Healy RN (offgoing nurse). Report included the following information SBAR, Kardex, Intake/Output, MAR, Accordion, Recent Results, Med Rec Status and Cardiac Rhythm ST. Problem: Ventilator Management  Goal: *Adequate oxygenation and ventilation  Outcome: Progressing Towards Goal  Goal: *Patient maintains clear airway/free of aspiration  Outcome: Progressing Towards Goal  Goal: *Absence of infection signs and symptoms  Outcome: Progressing Towards Goal  Goal: *Normal spontaneous ventilation  Outcome: Progressing Towards Goal     Problem: Pressure Injury - Risk of  Goal: *Prevention of pressure injury  Description: Document Jonathan Scale and appropriate interventions in the flowsheet. Outcome: Progressing Towards Goal  Note: Pressure Injury Interventions:  Sensory Interventions: Assess changes in LOC, Assess need for specialty bed, Avoid rigorous massage over bony prominences, Check visual cues for pain, Float heels, Keep linens dry and wrinkle-free, Maintain/enhance activity level, Minimize linen layers, Monitor skin under medical devices, Pad between skin to skin, Pressure redistribution bed/mattress (bed type), Turn and reposition approx.  every two hours (pillows and wedges if needed), Use 30-degree side-lying position, Suspension boots    Moisture Interventions: Absorbent underpads, Apply protective barrier, creams and emollients, Assess need for specialty bed, Check for incontinence Q2 hours and as needed, Internal/External fecal devices, Internal/External urinary devices, Maintain skin hydration (lotion/cream), Minimize layers, Moisture barrier    Activity Interventions: Assess need for specialty bed, Pressure redistribution bed/mattress(bed type)    Mobility Interventions: Assess need for specialty bed, Float heels, HOB 30 degrees or less, Pressure redistribution bed/mattress (bed type), Suspension boots, Turn and reposition approx.  every two hours(pillow and wedges)    Nutrition Interventions: Document food/fluid/supplement intake    Friction and Shear Interventions: Apply protective barrier, creams and emollients, Feet elevated on foot rest, Foam dressings/transparent film/skin sealants, HOB 30 degrees or less, Lift sheet, Lift team/patient mobility team, Minimize layers, Transfer aides:transfer board/Xiomy lift/ceiling lift, Transferring/repositioning devices

## 2023-03-04 NOTE — PROGRESS NOTES
0710: Bedside shift change report given to ONOFRE Farias and Nick Clements RN (oncoming nurse) by Chela Beckman RN (offgoing nurse). Report included the following information SBAR, Kardex, ED Summary, Procedure Summary, Intake/Output, MAR, Recent Results, Med Rec Status, Cardiac Rhythm ST, and Alarm Parameters . 0800: Assessment completed. Cardiac rhythm sinus tach. Pulses present and palpable. Pupils 6mm and fixed. Bilateral lungs clear and diminished in the lower lobes. Does not respond to any stimulus in extremities. Patient does not have a cough or gag reflex. Patient on ventilator. Settings AC/VC, PEEP 8, Rate 20, 50% FiO2. ETT 26 cm at the teeth. OGT at 58 cm at the teeth with intermittent suction. Left femoral HD cath. Right femoral central cath. A-line in right forearm. 18 G in right ante. 20 G in left ante. Patient has a rucker and FMS. Solumedrol, Levothyroxine, and Insulin infusing continuously. See MAR for rates and titrations. 1241: [facility] nurse notified of tachycardia. Levothyroxine IV stopped per [place]. 1015: Dr. Dayanara Damon notified of heart cath. 1200: Reassessment completed. See flow sheets. Per Ronen from [employer] okay to send Q8 labs after patient returns from cath lab.    1207: Patient going to cath lab for Left and right Heart cath. 1420: Return to CCU from cath lab. Blood now sent to lab. CXR ordered per Ronen with [employer]. Right groin post cath access. Will assess groin, vital signs, and Right leg pulses Frequently per policy. See flow sheet. 1530: Family at bedside. 1600: Reassessment completed. See flow sheets. Axillary temp 96.5 F. Placed 2 warm blankets on the patient. Right groin access from cath [facility].     1700: Axillary temp remains 96.5 F. Initiated lashay hugger on low per order. 1800: Temp now 97.7 Axillary. Lashay hugger still in place. 1920: Bedside shift change report given to Chela Beckman RN (oncoming nurse) by ONOFRE Farias and Nick Clements RN (offgoing nurse).  Report included the following information SBAR, Kardex, ED Summary, OR Summary, Procedure Summary, Intake/Output, MAR, Recent Results, and Cardiac Rhythm ST .

## 2023-03-05 ENCOUNTER — ANESTHESIA (OUTPATIENT)
Dept: SURGERY | Age: 33
End: 2023-03-05
Payer: COMMERCIAL

## 2023-03-05 ENCOUNTER — ANESTHESIA EVENT (OUTPATIENT)
Dept: SURGERY | Age: 33
End: 2023-03-05
Payer: COMMERCIAL

## 2023-03-05 VITALS — WEIGHT: 199.08 LBS | HEIGHT: 72 IN | TEMPERATURE: 95.9 F | BODY MASS INDEX: 26.96 KG/M2

## 2023-03-05 LAB
ALBUMIN SERPL-MCNC: 2.6 G/DL (ref 3.5–5)
ALBUMIN/GLOB SERPL: 0.8 (ref 1.1–2.2)
ALP SERPL-CCNC: 75 U/L (ref 45–117)
ALT SERPL-CCNC: 192 U/L (ref 12–78)
ANION GAP SERPL CALC-SCNC: 12 MMOL/L (ref 5–15)
APTT PPP: 29.1 SEC (ref 22.1–31)
ARTERIAL PATENCY WRIST A: ABNORMAL
ARTERIAL PATENCY WRIST A: ABNORMAL
AST SERPL-CCNC: 347 U/L (ref 15–37)
BACTERIA SPEC CULT: ABNORMAL
BACTERIA SPEC CULT: NORMAL
BASE EXCESS BLDA CALC-SCNC: 1.1 MMOL/L
BASE EXCESS BLDA CALC-SCNC: 1.5 MMOL/L
BASOPHILS # BLD: 0 K/UL (ref 0–0.1)
BASOPHILS NFR BLD: 0 % (ref 0–1)
BDY SITE: ABNORMAL
BDY SITE: ABNORMAL
BILIRUB DIRECT SERPL-MCNC: 0.4 MG/DL (ref 0–0.2)
BILIRUB SERPL-MCNC: 1.2 MG/DL (ref 0.2–1)
BUN SERPL-MCNC: 70 MG/DL (ref 6–20)
BUN/CREAT SERPL: 10 (ref 12–20)
CALCIUM SERPL-MCNC: 8.5 MG/DL (ref 8.5–10.1)
CHLORIDE SERPL-SCNC: 105 MMOL/L (ref 97–108)
CK SERPL-CCNC: 77 U/L (ref 39–308)
CO2 SERPL-SCNC: 24 MMOL/L (ref 21–32)
CREAT SERPL-MCNC: 7.1 MG/DL (ref 0.7–1.3)
DIFFERENTIAL METHOD BLD: ABNORMAL
EOSINOPHIL # BLD: 0 K/UL (ref 0–0.4)
EOSINOPHIL NFR BLD: 0 % (ref 0–7)
ERYTHROCYTE [DISTWIDTH] IN BLOOD BY AUTOMATED COUNT: 14 % (ref 11.5–14.5)
FIO2 ON VENT: 100 %
FIO2 ON VENT: 100 %
GAS FLOW.O2 SETTING OXYMISER: 20
GAS FLOW.O2 SETTING OXYMISER: 20
GLOBULIN SER CALC-MCNC: 3.1 G/DL (ref 2–4)
GLUCOSE BLD STRIP.AUTO-MCNC: 126 MG/DL (ref 65–117)
GLUCOSE BLD STRIP.AUTO-MCNC: 135 MG/DL (ref 65–117)
GLUCOSE BLD STRIP.AUTO-MCNC: 143 MG/DL (ref 65–117)
GLUCOSE BLD STRIP.AUTO-MCNC: 147 MG/DL (ref 65–117)
GLUCOSE BLD STRIP.AUTO-MCNC: 161 MG/DL (ref 65–117)
GLUCOSE BLD STRIP.AUTO-MCNC: 191 MG/DL (ref 65–117)
GLUCOSE BLD STRIP.AUTO-MCNC: 193 MG/DL (ref 65–117)
GLUCOSE SERPL-MCNC: 173 MG/DL (ref 65–100)
GRAM STN SPEC: ABNORMAL
HCO3 BLDA-SCNC: 24 MMOL/L (ref 22–26)
HCO3 BLDA-SCNC: 24 MMOL/L (ref 22–26)
HCT VFR BLD AUTO: 26.1 % (ref 36.6–50.3)
HGB BLD-MCNC: 9.5 G/DL (ref 12.1–17)
IMM GRANULOCYTES # BLD AUTO: 0 K/UL (ref 0–0.04)
IMM GRANULOCYTES NFR BLD AUTO: 0 % (ref 0–0.5)
INR PPP: 1.2 (ref 0.9–1.1)
LYMPHOCYTES # BLD: 0.5 K/UL (ref 0.8–3.5)
LYMPHOCYTES NFR BLD: 2 % (ref 12–49)
MAGNESIUM SERPL-MCNC: 2.4 MG/DL (ref 1.6–2.4)
MCH RBC QN AUTO: 31.6 PG (ref 26–34)
MCHC RBC AUTO-ENTMCNC: 36.4 G/DL (ref 30–36.5)
MCV RBC AUTO: 86.7 FL (ref 80–99)
MONOCYTES # BLD: 1 K/UL (ref 0–1)
MONOCYTES NFR BLD: 4 % (ref 5–13)
NEUTS BAND NFR BLD MANUAL: 4 %
NEUTS SEG # BLD: 24.7 K/UL (ref 1.8–8)
NEUTS SEG NFR BLD: 90 % (ref 32–75)
NRBC # BLD: 0 K/UL (ref 0–0.01)
NRBC BLD-RTO: 0 PER 100 WBC
PCO2 BLDA: 32 MMHG (ref 35–45)
PCO2 BLDA: 33 MMHG (ref 35–45)
PEEP RESPIRATORY: 5
PEEP RESPIRATORY: 5
PH BLDA: 7.48 (ref 7.35–7.45)
PH BLDA: 7.48 (ref 7.35–7.45)
PHOSPHATE SERPL-MCNC: 6.9 MG/DL (ref 2.6–4.7)
PLATELET # BLD AUTO: 127 K/UL (ref 150–400)
PMV BLD AUTO: 9.9 FL (ref 8.9–12.9)
PO2 BLDA: 497 MMHG (ref 80–100)
PO2 BLDA: 551 MMHG (ref 80–100)
POTASSIUM SERPL-SCNC: 3.6 MMOL/L (ref 3.5–5.1)
PROT SERPL-MCNC: 5.7 G/DL (ref 6.4–8.2)
PROTHROMBIN TIME: 12.6 SEC (ref 9–11.1)
RBC # BLD AUTO: 3.01 M/UL (ref 4.1–5.7)
RBC MORPH BLD: ABNORMAL
SAO2 % BLD: 100 % (ref 92–97)
SAO2 % BLD: 100 % (ref 92–97)
SAO2% DEVICE SAO2% SENSOR NAME: ABNORMAL
SAO2% DEVICE SAO2% SENSOR NAME: ABNORMAL
SERVICE CMNT-IMP: ABNORMAL
SERVICE CMNT-IMP: NORMAL
SODIUM SERPL-SCNC: 141 MMOL/L (ref 136–145)
SPECIMEN SITE: ABNORMAL
SPECIMEN SITE: ABNORMAL
THERAPEUTIC RANGE,PTTT: NORMAL SECS (ref 58–77)
TROPONIN I SERPL HS-MCNC: 2258 NG/L (ref 0–76)
VT SETTING VENT: 570
VT SETTING VENT: 570
WBC # BLD AUTO: 26.2 K/UL (ref 4.1–11.1)

## 2023-03-05 PROCEDURE — 84100 ASSAY OF PHOSPHORUS: CPT

## 2023-03-05 PROCEDURE — 74011000258 HC RX REV CODE- 258

## 2023-03-05 PROCEDURE — 80076 HEPATIC FUNCTION PANEL: CPT

## 2023-03-05 PROCEDURE — 85025 COMPLETE CBC W/AUTO DIFF WBC: CPT

## 2023-03-05 PROCEDURE — 83735 ASSAY OF MAGNESIUM: CPT

## 2023-03-05 PROCEDURE — 84484 ASSAY OF TROPONIN QUANT: CPT

## 2023-03-05 PROCEDURE — 74011000258 HC RX REV CODE- 258: Performed by: NURSE ANESTHETIST, CERTIFIED REGISTERED

## 2023-03-05 PROCEDURE — 2709999900 HC NON-CHARGEABLE SUPPLY

## 2023-03-05 PROCEDURE — 74011000250 HC RX REV CODE- 250: Performed by: INTERNAL MEDICINE

## 2023-03-05 PROCEDURE — 82962 GLUCOSE BLOOD TEST: CPT

## 2023-03-05 PROCEDURE — 94003 VENT MGMT INPAT SUBQ DAY: CPT

## 2023-03-05 PROCEDURE — 94640 AIRWAY INHALATION TREATMENT: CPT

## 2023-03-05 PROCEDURE — 76010000134 HC OR TIME 3.5 TO 4 HR

## 2023-03-05 PROCEDURE — 77030019908 HC STETH ESOPH SIMS -A: Performed by: NURSE ANESTHETIST, CERTIFIED REGISTERED

## 2023-03-05 PROCEDURE — 74011000250 HC RX REV CODE- 250

## 2023-03-05 PROCEDURE — 85730 THROMBOPLASTIN TIME PARTIAL: CPT

## 2023-03-05 PROCEDURE — 74011250636 HC RX REV CODE- 250/636

## 2023-03-05 PROCEDURE — 88307 TISSUE EXAM BY PATHOLOGIST: CPT

## 2023-03-05 PROCEDURE — 74011250636 HC RX REV CODE- 250/636: Performed by: NURSE ANESTHETIST, CERTIFIED REGISTERED

## 2023-03-05 PROCEDURE — 74011000250 HC RX REV CODE- 250: Performed by: NURSE ANESTHETIST, CERTIFIED REGISTERED

## 2023-03-05 PROCEDURE — 76060000038 HC ANESTHESIA 3.5 TO 4 HR

## 2023-03-05 PROCEDURE — 88313 SPECIAL STAINS GROUP 2: CPT

## 2023-03-05 PROCEDURE — 88305 TISSUE EXAM BY PATHOLOGIST: CPT

## 2023-03-05 PROCEDURE — 82550 ASSAY OF CK (CPK): CPT

## 2023-03-05 PROCEDURE — 80048 BASIC METABOLIC PNL TOTAL CA: CPT

## 2023-03-05 PROCEDURE — 85610 PROTHROMBIN TIME: CPT

## 2023-03-05 PROCEDURE — 36415 COLL VENOUS BLD VENIPUNCTURE: CPT

## 2023-03-05 PROCEDURE — 82803 BLOOD GASES ANY COMBINATION: CPT

## 2023-03-05 PROCEDURE — 94668 MNPJ CHEST WALL SBSQ: CPT

## 2023-03-05 RX ORDER — MIDAZOLAM HYDROCHLORIDE 1 MG/ML
INJECTION, SOLUTION INTRAMUSCULAR; INTRAVENOUS AS NEEDED
Status: DISCONTINUED | OUTPATIENT
Start: 2023-03-05 | End: 2023-03-05 | Stop reason: HOSPADM

## 2023-03-05 RX ORDER — PHENYLEPHRINE HCL IN 0.9% NACL 0.4MG/10ML
SYRINGE (ML) INTRAVENOUS AS NEEDED
Status: DISCONTINUED | OUTPATIENT
Start: 2023-03-05 | End: 2023-03-05 | Stop reason: HOSPADM

## 2023-03-05 RX ORDER — ROCURONIUM BROMIDE 10 MG/ML
INJECTION, SOLUTION INTRAVENOUS AS NEEDED
Status: DISCONTINUED | OUTPATIENT
Start: 2023-03-05 | End: 2023-03-05 | Stop reason: HOSPADM

## 2023-03-05 RX ORDER — METOPROLOL TARTRATE 5 MG/5ML
5 INJECTION INTRAVENOUS ONCE
Status: COMPLETED | OUTPATIENT
Start: 2023-03-05 | End: 2023-03-05

## 2023-03-05 RX ORDER — MORPHINE SULFATE 10 MG/ML
INJECTION, SOLUTION INTRAMUSCULAR; INTRAVENOUS AS NEEDED
Status: DISCONTINUED | OUTPATIENT
Start: 2023-03-05 | End: 2023-03-05 | Stop reason: HOSPADM

## 2023-03-05 RX ORDER — HEPARIN SODIUM 1000 [USP'U]/ML
INJECTION, SOLUTION INTRAVENOUS; SUBCUTANEOUS AS NEEDED
Status: DISCONTINUED | OUTPATIENT
Start: 2023-03-05 | End: 2023-03-05 | Stop reason: HOSPADM

## 2023-03-05 RX ORDER — SODIUM CHLORIDE, SODIUM LACTATE, POTASSIUM CHLORIDE, CALCIUM CHLORIDE 600; 310; 30; 20 MG/100ML; MG/100ML; MG/100ML; MG/100ML
INJECTION, SOLUTION INTRAVENOUS
Status: DISCONTINUED | OUTPATIENT
Start: 2023-03-05 | End: 2023-03-05 | Stop reason: HOSPADM

## 2023-03-05 RX ADMIN — MORPHINE SULFATE 2 MG: 10 INJECTION INTRAVENOUS at 08:53

## 2023-03-05 RX ADMIN — SODIUM CHLORIDE, POTASSIUM CHLORIDE, SODIUM LACTATE AND CALCIUM CHLORIDE: 600; 310; 30; 20 INJECTION, SOLUTION INTRAVENOUS at 08:07

## 2023-03-05 RX ADMIN — MORPHINE SULFATE 2 MG: 10 INJECTION INTRAVENOUS at 09:16

## 2023-03-05 RX ADMIN — HEPARIN SODIUM 30000 UNITS: 1000 INJECTION, SOLUTION INTRAVENOUS; SUBCUTANEOUS at 09:41

## 2023-03-05 RX ADMIN — MORPHINE SULFATE 2 MG: 10 INJECTION INTRAVENOUS at 09:38

## 2023-03-05 RX ADMIN — PIPERACILLIN AND TAZOBACTAM 3.38 G: 3; .375 INJECTION, POWDER, FOR SOLUTION INTRAVENOUS at 02:30

## 2023-03-05 RX ADMIN — Medication 80 MCG: at 08:22

## 2023-03-05 RX ADMIN — ROCURONIUM BROMIDE 50 MG: 10 INJECTION INTRAVENOUS at 09:15

## 2023-03-05 RX ADMIN — METOPROLOL TARTRATE 5 MG: 5 INJECTION, SOLUTION INTRAVENOUS at 06:17

## 2023-03-05 RX ADMIN — Medication 80 MCG: at 08:20

## 2023-03-05 RX ADMIN — ALBUTEROL SULFATE 2.5 MG: 2.5 SOLUTION RESPIRATORY (INHALATION) at 04:04

## 2023-03-05 RX ADMIN — ROCURONIUM BROMIDE 100 MG: 10 INJECTION INTRAVENOUS at 08:15

## 2023-03-05 RX ADMIN — METOPROLOL TARTRATE 5 MG: 5 INJECTION, SOLUTION INTRAVENOUS at 01:03

## 2023-03-05 RX ADMIN — Medication 80 MCG: at 08:47

## 2023-03-05 RX ADMIN — MORPHINE SULFATE 2 MG: 10 INJECTION INTRAVENOUS at 08:25

## 2023-03-05 RX ADMIN — PHENYLEPHRINE HYDROCHLORIDE 40 MCG/MIN: 10 INJECTION INTRAVENOUS at 08:50

## 2023-03-05 RX ADMIN — MIDAZOLAM HYDROCHLORIDE 2 MG: 1 INJECTION, SOLUTION INTRAMUSCULAR; INTRAVENOUS at 08:07

## 2023-03-05 RX ADMIN — MORPHINE SULFATE 2 MG: 10 INJECTION INTRAVENOUS at 08:40

## 2023-03-05 NOTE — PROGRESS NOTES
[id number]  Blood sample for [facility] taken, CCXR done, Blood samples routine sent after CP, concern about HR & BP > [facility] advised to keep monitoring for now and call if SBP > 150 mmHg or HR > 130 bpm, decrease Solu- Medrol to 50 mg/hr,       2147  [facility] Rep ordered Metoprolol 5 mg IV once order for HR & BP Visit Vitals  /80   Pulse (!) 122   Temp 98.8 °F (37.1 °C)   Resp 20   Ht 6' (1.829 m)   Wt 90.3 kg (199 lb 1.2 oz)   SpO2 95%   BMI 27.00 kg/m²        0015  [facility] called > updated about Lab and VS > another dose of Metoprolol 5 mg IV once ordered. Visit Vitals  BP (!) 140/78   Pulse (!) 120   Temp 99.5 °F (37.5 °C)   Resp 20   Ht 6' (1.829 m)   Wt 90.3 kg (199 lb 1.2 oz)   SpO2 96%   BMI 27.00 kg/m²     0797-4662  [facility] at bedside updated > Arterial Blood Gas result:  pO2 551; pCO2 33; pH 7.48;  HCO3 24, %O2 Sat 100% >to decrease RR on ventilator to 18 from 20, Metoprolol 5 mg IV once for /87 mmHg  mmHg, , Solu-Medrol stopped,     0700  Bedside and Verbal shift change report given to Marii Jhaveri RN (oncoming nurse) by Markie Chapman RN (offgoing nurse).  Report included the following information SBAR, Kardex, Intake/Output, MAR, Accordion, Recent Results, Med Rec Status and Cardiac Rhythm ST.

## 2023-03-05 NOTE — PROGRESS NOTES
0710: Bedside shift change report given to ONOFRE Farias and Yuliet Black RN (oncoming nurse) by Ciarra Mariscal RN (offgoing nurse). Report included the following information SBAR, Kardex, ED Summary, OR Summary, Procedure Summary, Intake/Output, MAR, Recent Results, and Cardiac Rhythm ST . Per Ronen from "[employer]", [employer] will take place at 0800. Family at bedside. 0800: Alma walk to OR.     0805: Patient arrived at OR.

## 2023-03-05 NOTE — ANESTHESIA POSTPROCEDURE EVALUATION
Procedure(s):  ORGAN HARVEST MULTIPLE. general    Anesthesia Post Evaluation      Multimodal analgesia: multimodal analgesia used between 6 hours prior to anesthesia start to PACU discharge  Patient location during evaluation: PACU  Patient participation: complete - patient cannot participate  Level of consciousness: sleepy but conscious and obtunded/minimal responses  Pain score: 0  Pain management: adequate  Airway patency: patent  Anesthetic complications: no  Cardiovascular status: acceptable and unstable  Respiratory status: acceptable and ventilator  Hydration status: acceptable  Comments: +Post-Anesthesia Evaluation and Assessment    Patient: Valeri Rios MRN: [id number]  SSN: [id number]   YOB: 1990  Age: 29 y.o. Sex: male      Cardiovascular Function/Vital Signs    BP (!) 0/0   Pulse (!) 0   Temp (!) 35.5 °C (95.9 °F)   Resp (!) 0   Ht 6' (1.829 m)   Wt 90.3 kg (199 lb 1.2 oz)   SpO2 (!) 0%   BMI 27.00 kg/m²     Patient is status post Procedure(s):  ORGAN HARVEST MULTIPLE. Nausea/Vomiting: Controlled. Postoperative hydration reviewed and adequate. Pain:  Pain Scale 1: Behavioral Pain Scale (BPS) (03/05/23 0400)  Pain Intensity 1: 3 (03/05/23 0400)   Managed. Neurological Status: At baseline. Mental Status and Level of Consciousness: Arousable. Pulmonary Status:   O2 Device:  (ETT present.) (03/05/23 0956)   Adequate oxygenation and airway patent. Complications related to anesthesia: None    Post-anesthesia assessment completed. No concerns. Signed By: Herman Pryor MD    3/5/2023  Post anesthesia nausea and vomiting:  controlled  Final Post Anesthesia Temperature Assessment:  Normothermia (36.0-37.5 degrees C)      INITIAL Post-op Vital signs: No vitals data found for the desired time range.

## 2023-03-05 NOTE — ANESTHESIA PREPROCEDURE EVALUATION
Relevant Problems   NEUROLOGY   (+) Convulsive syncope       Anesthetic History   No history of anesthetic complications            Review of Systems / Medical History  Patient summary reviewed, nursing notes reviewed and pertinent labs reviewed    Pulmonary  Within defined limits                 Neuro/Psych   Within defined limits           Cardiovascular  Within defined limits                Exercise tolerance: >4 METS     GI/Hepatic/Renal  Within defined limits              Endo/Other  Within defined limits           Other Findings   Comments: Organ donation            Physical Exam    Airway  Mallampati: II  TM Distance: 4 - 6 cm  Neck ROM: normal range of motion   Mouth opening: Normal  Intubated   Cardiovascular  Regular rate and rhythm,  S1 and S2 normal,  no murmur, click, rub, or gallop  Rhythm: regular  Rate: normal         Dental  No notable dental hx       Pulmonary  Breath sounds clear to auscultation               Abdominal  GI exam deferred       Other Findings            Anesthetic Plan    ASA: 6  Anesthesia type: general    Monitoring Plan: BIS      Induction: Intravenous

## 2023-03-06 LAB
ANION GAP BLD CALC-SCNC: ABNORMAL MMOL/L (ref 10–20)
BASE EXCESS BLDA CALC-SCNC: 1.9 MMOL/L
BDY SITE: ABNORMAL
CA-I BLD-MCNC: 1.14 MMOL/L (ref 1.12–1.32)
CHLORIDE BLD-SCNC: 98 MMOL/L (ref 98–107)
CREAT BLD-MCNC: 3.47 MG/DL (ref 0.6–1.3)
HCO3 BLDA-SCNC: 25 MMOL/L (ref 22–26)
PCO2 BLDA: 36 MMHG (ref 35–45)
PH BLDA: 7.47 (ref 7.35–7.45)
PO2 BLDA: 521 MMHG (ref 80–100)
POTASSIUM BLD-SCNC: 3.4 MMOL/L (ref 3.5–5.1)
SAO2 % BLD: 100 % (ref 92–97)
SAO2% DEVICE SAO2% SENSOR NAME: ABNORMAL
SODIUM BLD-SCNC: 136 MMOL/L (ref 136–145)
SPECIMEN SITE: ABNORMAL

## 2023-03-06 NOTE — CARDIO/PULMONARY
Cardiac Rehab Note: chart review/referral     Cardiac cath 3/4/23:  \"no angiographically apparent CAD\"    Smoking history assessed. Patient is a former smoker. Smoking Cessation Program link has not been added to the AVS.     Cath for organ donor evaluation.

## 2023-03-06 NOTE — PROGRESS NOTES
Test Date: March 2, 2023    History: Patient is status post cardiorespiratory arrest and prolonged resuscitation    Medications: See chart    Patient consent: Correct patient identified    Description of procedure: This EEG was obtained using a 10 lead, 8 channel system positioned circumferentially without any parasagittal coverage (rapid EEG). Computer selected EEG is reviewed as well as background features and all clinically significant events. Clarity algorithm utilized and implemented to provide analysis of underlying activity and seizure detection used to facilitate reading. EEG classification: 1.) Depression grade 2 generalized    Description of recording: This study began at 2:37 PM and ended at 4:43 PM for a total time of the study of about 2 hours and 5 minutes, during which time there was marked suppression of amplitude throughout the study, showing some questionable borderline 2 to 6 Hz activity and very low amplitude versus muscle artifact in the recording. There were no clear spike or spike and wave discharges no recorded electrographic or dysrhythmic spells of any type. Stimulation the patient produced no response and neither photic stimulation or hyperventilation were performed. Impression: Severely abnormal electroencephalogram showing widespread suppression of all cortical activity and amplitudes, suggesting widespread cortical neuronal loss, and correlation with standard EEG recording and protocols and perhaps scribbled the study would be of further benefit. Comment: A normal EEG does not rule out the diagnosis of a seizure disorder; clinical  correlation is advised. If there is still persistent suspicion for continued seizure-like  activity, would advise obtaining an electroencephalogram with the 10-20 international  system for improved spatial resolution and parasagittal coverage.

## 2023-03-07 LAB
BACTERIA SPEC CULT: NORMAL
SERVICE CMNT-IMP: NORMAL

## 2023-03-08 LAB
BACTERIA SPEC CULT: NORMAL
BACTERIA SPEC CULT: NORMAL
SERVICE CMNT-IMP: NORMAL
SERVICE CMNT-IMP: NORMAL
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
## (undated) DEVICE — GOWN,SIRUS,FABRNF,XL,20/CS: Brand: MEDLINE

## (undated) DEVICE — SHEET, T, LAPAROTOMY, STERILE: Brand: MEDLINE

## (undated) DEVICE — REM POLYHESIVE ADULT PATIENT RETURN ELECTRODE: Brand: VALLEYLAB

## (undated) DEVICE — SPONGE LAPAROTOMY W18XL18IN WHITE STRUNG RADIOPAQUE STERILE